# Patient Record
Sex: MALE | Race: ASIAN | NOT HISPANIC OR LATINO | Employment: OTHER | URBAN - METROPOLITAN AREA
[De-identification: names, ages, dates, MRNs, and addresses within clinical notes are randomized per-mention and may not be internally consistent; named-entity substitution may affect disease eponyms.]

---

## 2017-01-03 ENCOUNTER — HOSPITAL ENCOUNTER (OUTPATIENT)
Dept: LAB | Facility: MEDICAL CENTER | Age: 72
End: 2017-01-03
Attending: PHYSICIAN ASSISTANT
Payer: MEDICARE

## 2017-01-03 LAB
25(OH)D3 SERPL-MCNC: 24 NG/ML (ref 30–100)
BASOPHILS # BLD AUTO: 0.03 K/UL (ref 0–0.12)
BASOPHILS NFR BLD AUTO: 0.5 % (ref 0–1.8)
CK SERPL-CCNC: 124 U/L (ref 0–154)
CREAT UR-MCNC: 44.3 MG/DL
EOSINOPHIL # BLD: 0.42 K/UL (ref 0–0.51)
EOSINOPHIL NFR BLD AUTO: 7.5 % (ref 0–6.9)
ERYTHROCYTE [DISTWIDTH] IN BLOOD BY AUTOMATED COUNT: 43.2 FL (ref 35.9–50)
EST. AVERAGE GLUCOSE BLD GHB EST-MCNC: 123 MG/DL
HBA1C MFR BLD: 5.9 % (ref 0–5.6)
HCT VFR BLD AUTO: 44.1 % (ref 42–52)
HCV AB S/CO SERPL IA: NEGATIVE
HGB BLD-MCNC: 15 G/DL (ref 14–18)
IMM GRANULOCYTES # BLD AUTO: 0.01 K/UL (ref 0–0.11)
IMM GRANULOCYTES NFR BLD AUTO: 0.2 % (ref 0–0.9)
LYMPHOCYTES # BLD: 2.2 K/UL (ref 1–4.8)
LYMPHOCYTES NFR BLD AUTO: 39.5 % (ref 22–41)
MCH RBC QN AUTO: 32 PG (ref 27–33)
MCHC RBC AUTO-ENTMCNC: 34 G/DL (ref 33.7–35.3)
MCV RBC AUTO: 94 FL (ref 81.4–97.8)
MICROALBUMIN UR-MCNC: 0.8 MG/DL
MICROALBUMIN/CREAT UR: 18 MG/G (ref 0–30)
MONOCYTES # BLD: 0.43 K/UL (ref 0–0.85)
MONOCYTES NFR BLD AUTO: 7.7 % (ref 0–13.4)
NEUTROPHILS # BLD: 2.48 K/UL (ref 1.82–7.42)
NEUTROPHILS NFR BLD AUTO: 44.6 % (ref 44–72)
NRBC # BLD AUTO: 0 K/UL
NRBC BLD-RTO: 0 /100 WBC
PLATELET # BLD AUTO: 244 K/UL (ref 164–446)
PMV BLD AUTO: 9.5 FL (ref 9–12.9)
PSA SERPL DL<=0.01 NG/ML-MCNC: 4.8 NG/ML (ref 0–4)
RBC # BLD AUTO: 4.69 M/UL (ref 4.7–6.1)
TSH SERPL DL<=0.005 MIU/L-ACNC: 0.95 UIU/ML (ref 0.3–3.7)
WBC # BLD AUTO: 5.6 K/UL (ref 4.8–10.8)

## 2017-01-03 PROCEDURE — 86803 HEPATITIS C AB TEST: CPT

## 2017-01-03 PROCEDURE — 82043 UR ALBUMIN QUANTITATIVE: CPT

## 2017-01-03 PROCEDURE — 84153 ASSAY OF PSA TOTAL: CPT

## 2017-01-03 PROCEDURE — 83036 HEMOGLOBIN GLYCOSYLATED A1C: CPT

## 2017-01-03 PROCEDURE — 82306 VITAMIN D 25 HYDROXY: CPT

## 2017-01-03 PROCEDURE — 84443 ASSAY THYROID STIM HORMONE: CPT

## 2017-01-03 PROCEDURE — 82550 ASSAY OF CK (CPK): CPT

## 2017-01-03 PROCEDURE — 85025 COMPLETE CBC W/AUTO DIFF WBC: CPT

## 2017-01-03 PROCEDURE — 36415 COLL VENOUS BLD VENIPUNCTURE: CPT

## 2017-01-03 PROCEDURE — 82570 ASSAY OF URINE CREATININE: CPT

## 2017-02-17 ENCOUNTER — HOSPITAL ENCOUNTER (OUTPATIENT)
Dept: LAB | Facility: MEDICAL CENTER | Age: 72
End: 2017-02-17
Attending: FAMILY MEDICINE
Payer: MEDICARE

## 2017-02-17 LAB — PSA SERPL DL<=0.01 NG/ML-MCNC: 4.28 NG/ML (ref 0–4)

## 2017-02-17 PROCEDURE — 84153 ASSAY OF PSA TOTAL: CPT

## 2017-02-17 PROCEDURE — 36415 COLL VENOUS BLD VENIPUNCTURE: CPT

## 2017-02-22 ENCOUNTER — HOSPITAL ENCOUNTER (OUTPATIENT)
Facility: MEDICAL CENTER | Age: 72
End: 2017-02-22
Attending: UROLOGY
Payer: MEDICARE

## 2017-02-22 LAB
APPEARANCE UR: ABNORMAL
BILIRUB UR QL STRIP.AUTO: NEGATIVE
COLOR UR AUTO: ABNORMAL
EPITHELIAL CELLS 1715: ABNORMAL /HPF
GLUCOSE UR STRIP.AUTO-MCNC: NEGATIVE MG/DL
HYALINE CAST   1831: ABNORMAL /LPF
KETONES UR STRIP.AUTO-MCNC: NEGATIVE MG/DL
LEUKOCYTE ESTERASE UR QL STRIP.AUTO: ABNORMAL
MICRO URNS: ABNORMAL
MUCOUS THREADS URNS QL MICRO: ABNORMAL /HPF
NITRITE UR QL STRIP.AUTO: NEGATIVE
PH UR: 5 [PH]
PROT UR QL STRIP: NEGATIVE MG/DL
PSA SERPL DL<=0.01 NG/ML-MCNC: 4.04 NG/ML (ref 0–4)
RBC #/AREA URNS HPF: ABNORMAL /HPF
RBC UR QL AUTO: NEGATIVE
SP GR UR STRIP.AUTO: 1.01
URIC ACID CRYSTAL  1766: ABNORMAL /HPF
WBC #/AREA URNS HPF: ABNORMAL /HPF

## 2017-02-22 PROCEDURE — 81001 URINALYSIS AUTO W/SCOPE: CPT

## 2017-02-22 PROCEDURE — 84153 ASSAY OF PSA TOTAL: CPT

## 2017-02-22 PROCEDURE — 87086 URINE CULTURE/COLONY COUNT: CPT

## 2017-02-24 LAB
BACTERIA UR CULT: NORMAL
SIGNIFICANT IND 70042: NORMAL
SOURCE SOURCE: NORMAL

## 2017-07-19 ENCOUNTER — HOSPITAL ENCOUNTER (OUTPATIENT)
Dept: LAB | Facility: MEDICAL CENTER | Age: 72
End: 2017-07-19
Attending: FAMILY MEDICINE
Payer: MEDICARE

## 2017-07-19 LAB
ALBUMIN SERPL BCP-MCNC: 4 G/DL (ref 3.2–4.9)
ALBUMIN/GLOB SERPL: 1.5 G/DL
ALP SERPL-CCNC: 49 U/L (ref 30–99)
ALT SERPL-CCNC: 15 U/L (ref 2–50)
ANION GAP SERPL CALC-SCNC: 4 MMOL/L (ref 0–11.9)
APPEARANCE UR: CLEAR
AST SERPL-CCNC: 20 U/L (ref 12–45)
BASOPHILS # BLD AUTO: 0.2 % (ref 0–1.8)
BASOPHILS # BLD: 0.01 K/UL (ref 0–0.12)
BILIRUB SERPL-MCNC: 1.1 MG/DL (ref 0.1–1.5)
BILIRUB UR QL STRIP.AUTO: NEGATIVE
BUN SERPL-MCNC: 17 MG/DL (ref 8–22)
CALCIUM SERPL-MCNC: 9.2 MG/DL (ref 8.5–10.5)
CHLORIDE SERPL-SCNC: 104 MMOL/L (ref 96–112)
CHOLEST SERPL-MCNC: 132 MG/DL (ref 100–199)
CO2 SERPL-SCNC: 28 MMOL/L (ref 20–33)
COLOR UR: YELLOW
CREAT SERPL-MCNC: 1.08 MG/DL (ref 0.5–1.4)
EOSINOPHIL # BLD AUTO: 0.49 K/UL (ref 0–0.51)
EOSINOPHIL NFR BLD: 9.8 % (ref 0–6.9)
ERYTHROCYTE [DISTWIDTH] IN BLOOD BY AUTOMATED COUNT: 42.1 FL (ref 35.9–50)
EST. AVERAGE GLUCOSE BLD GHB EST-MCNC: 117 MG/DL
GFR SERPL CREATININE-BSD FRML MDRD: >60 ML/MIN/1.73 M 2
GLOBULIN SER CALC-MCNC: 2.7 G/DL (ref 1.9–3.5)
GLUCOSE SERPL-MCNC: 88 MG/DL (ref 65–99)
GLUCOSE UR STRIP.AUTO-MCNC: NEGATIVE MG/DL
HBA1C MFR BLD: 5.7 % (ref 0–5.6)
HCT VFR BLD AUTO: 42.3 % (ref 42–52)
HDLC SERPL-MCNC: 53 MG/DL
HGB BLD-MCNC: 14.3 G/DL (ref 14–18)
IMM GRANULOCYTES # BLD AUTO: 0.01 K/UL (ref 0–0.11)
IMM GRANULOCYTES NFR BLD AUTO: 0.2 % (ref 0–0.9)
KETONES UR STRIP.AUTO-MCNC: NEGATIVE MG/DL
LDLC SERPL CALC-MCNC: 66 MG/DL
LEUKOCYTE ESTERASE UR QL STRIP.AUTO: NEGATIVE
LYMPHOCYTES # BLD AUTO: 1.97 K/UL (ref 1–4.8)
LYMPHOCYTES NFR BLD: 39.6 % (ref 22–41)
MCH RBC QN AUTO: 32.2 PG (ref 27–33)
MCHC RBC AUTO-ENTMCNC: 33.8 G/DL (ref 33.7–35.3)
MCV RBC AUTO: 95.3 FL (ref 81.4–97.8)
MICRO URNS: NORMAL
MONOCYTES # BLD AUTO: 0.39 K/UL (ref 0–0.85)
MONOCYTES NFR BLD AUTO: 7.8 % (ref 0–13.4)
NEUTROPHILS # BLD AUTO: 2.11 K/UL (ref 1.82–7.42)
NEUTROPHILS NFR BLD: 42.4 % (ref 44–72)
NITRITE UR QL STRIP.AUTO: NEGATIVE
NRBC # BLD AUTO: 0 K/UL
NRBC BLD AUTO-RTO: 0 /100 WBC
PH UR STRIP.AUTO: 6 [PH]
PLATELET # BLD AUTO: 207 K/UL (ref 164–446)
PMV BLD AUTO: 10 FL (ref 9–12.9)
POTASSIUM SERPL-SCNC: 3.9 MMOL/L (ref 3.6–5.5)
PROT SERPL-MCNC: 6.7 G/DL (ref 6–8.2)
PROT UR QL STRIP: NEGATIVE MG/DL
PSA SERPL-MCNC: 1.08 NG/ML (ref 0–4)
RBC # BLD AUTO: 4.44 M/UL (ref 4.7–6.1)
RBC UR QL AUTO: NEGATIVE
SODIUM SERPL-SCNC: 136 MMOL/L (ref 135–145)
SP GR UR STRIP.AUTO: 1.01
TRIGL SERPL-MCNC: 66 MG/DL (ref 0–149)
TSH SERPL DL<=0.005 MIU/L-ACNC: 0.55 UIU/ML (ref 0.3–3.7)
UROBILINOGEN UR STRIP.AUTO-MCNC: 0.2 MG/DL
WBC # BLD AUTO: 5 K/UL (ref 4.8–10.8)

## 2017-07-19 PROCEDURE — 84153 ASSAY OF PSA TOTAL: CPT

## 2017-07-19 PROCEDURE — 85025 COMPLETE CBC W/AUTO DIFF WBC: CPT

## 2017-07-19 PROCEDURE — 83036 HEMOGLOBIN GLYCOSYLATED A1C: CPT

## 2017-07-19 PROCEDURE — 81003 URINALYSIS AUTO W/O SCOPE: CPT

## 2017-07-19 PROCEDURE — 83013 H PYLORI (C-13) BREATH: CPT

## 2017-07-19 PROCEDURE — 80061 LIPID PANEL: CPT

## 2017-07-19 PROCEDURE — 80053 COMPREHEN METABOLIC PANEL: CPT

## 2017-07-19 PROCEDURE — 84443 ASSAY THYROID STIM HORMONE: CPT

## 2017-07-19 PROCEDURE — 36415 COLL VENOUS BLD VENIPUNCTURE: CPT

## 2017-07-20 LAB — UREA BREATH TEST QL: NEGATIVE

## 2017-08-11 ENCOUNTER — HOSPITAL ENCOUNTER (OUTPATIENT)
Dept: RADIOLOGY | Facility: MEDICAL CENTER | Age: 72
End: 2017-08-11
Attending: SURGERY
Payer: MEDICARE

## 2017-08-11 DIAGNOSIS — I74.5 EMBOLISM AND THROMBOSIS OF ILIAC ARTERY (HCC): ICD-10-CM

## 2017-08-11 PROCEDURE — 93922 UPR/L XTREMITY ART 2 LEVELS: CPT | Mod: XU

## 2017-08-11 PROCEDURE — 93922 UPR/L XTREMITY ART 2 LEVELS: CPT

## 2017-08-11 PROCEDURE — 93978 VASCULAR STUDY: CPT

## 2017-11-22 ENCOUNTER — HOSPITAL ENCOUNTER (OUTPATIENT)
Dept: LAB | Facility: MEDICAL CENTER | Age: 72
End: 2017-11-22
Attending: FAMILY MEDICINE
Payer: MEDICARE

## 2017-11-22 LAB
CHOLEST SERPL-MCNC: 117 MG/DL (ref 100–199)
HDLC SERPL-MCNC: 58 MG/DL
LDLC SERPL CALC-MCNC: 44 MG/DL
TRIGL SERPL-MCNC: 75 MG/DL (ref 0–149)

## 2017-11-22 PROCEDURE — 80061 LIPID PANEL: CPT

## 2017-11-22 PROCEDURE — 36415 COLL VENOUS BLD VENIPUNCTURE: CPT

## 2017-12-28 ENCOUNTER — HOSPITAL ENCOUNTER (OUTPATIENT)
Dept: CARDIOLOGY | Facility: MEDICAL CENTER | Age: 72
End: 2017-12-28
Attending: INTERNAL MEDICINE
Payer: MEDICARE

## 2017-12-28 ENCOUNTER — HOSPITAL ENCOUNTER (OUTPATIENT)
Dept: RADIOLOGY | Facility: MEDICAL CENTER | Age: 72
End: 2017-12-28
Attending: INTERNAL MEDICINE
Payer: MEDICARE

## 2017-12-28 DIAGNOSIS — R07.89 ANTERIOR CHEST WALL PAIN: ICD-10-CM

## 2017-12-28 DIAGNOSIS — I35.1 NONRHEUMATIC AORTIC VALVE INSUFFICIENCY: ICD-10-CM

## 2017-12-28 PROCEDURE — 93306 TTE W/DOPPLER COMPLETE: CPT

## 2017-12-28 PROCEDURE — A9502 TC99M TETROFOSMIN: HCPCS

## 2017-12-29 LAB
LV EJECT FRACT  99904: 65
LV EJECT FRACT MOD 2C 99903: 63.78
LV EJECT FRACT MOD 4C 99902: 64.6
LV EJECT FRACT MOD BP 99901: 64.86

## 2018-01-23 ENCOUNTER — HOSPITAL ENCOUNTER (OUTPATIENT)
Dept: LAB | Facility: MEDICAL CENTER | Age: 73
End: 2018-01-23
Attending: FAMILY MEDICINE
Payer: MEDICARE

## 2018-01-23 LAB
ALBUMIN SERPL BCP-MCNC: 4.9 G/DL (ref 3.2–4.9)
ALBUMIN/GLOB SERPL: 2 G/DL
ALP SERPL-CCNC: 50 U/L (ref 30–99)
ALT SERPL-CCNC: 24 U/L (ref 2–50)
ANION GAP SERPL CALC-SCNC: 6 MMOL/L (ref 0–11.9)
APPEARANCE UR: CLEAR
AST SERPL-CCNC: 24 U/L (ref 12–45)
BASOPHILS # BLD AUTO: 0.5 % (ref 0–1.8)
BASOPHILS # BLD: 0.03 K/UL (ref 0–0.12)
BILIRUB SERPL-MCNC: 1 MG/DL (ref 0.1–1.5)
BILIRUB UR QL STRIP.AUTO: NEGATIVE
BUN SERPL-MCNC: 14 MG/DL (ref 8–22)
CALCIUM SERPL-MCNC: 9.6 MG/DL (ref 8.5–10.5)
CHLORIDE SERPL-SCNC: 102 MMOL/L (ref 96–112)
CHOLEST SERPL-MCNC: 129 MG/DL (ref 100–199)
CO2 SERPL-SCNC: 30 MMOL/L (ref 20–33)
COLOR UR: YELLOW
CREAT SERPL-MCNC: 1.04 MG/DL (ref 0.5–1.4)
EOSINOPHIL # BLD AUTO: 0.32 K/UL (ref 0–0.51)
EOSINOPHIL NFR BLD: 5.5 % (ref 0–6.9)
ERYTHROCYTE [DISTWIDTH] IN BLOOD BY AUTOMATED COUNT: 44.4 FL (ref 35.9–50)
EST. AVERAGE GLUCOSE BLD GHB EST-MCNC: 131 MG/DL
GLOBULIN SER CALC-MCNC: 2.4 G/DL (ref 1.9–3.5)
GLUCOSE SERPL-MCNC: 106 MG/DL (ref 65–99)
GLUCOSE UR STRIP.AUTO-MCNC: NEGATIVE MG/DL
HBA1C MFR BLD: 6.2 % (ref 0–5.6)
HCT VFR BLD AUTO: 45.5 % (ref 42–52)
HDLC SERPL-MCNC: 65 MG/DL
HGB BLD-MCNC: 14.8 G/DL (ref 14–18)
IMM GRANULOCYTES # BLD AUTO: 0.01 K/UL (ref 0–0.11)
IMM GRANULOCYTES NFR BLD AUTO: 0.2 % (ref 0–0.9)
KETONES UR STRIP.AUTO-MCNC: NEGATIVE MG/DL
LDLC SERPL CALC-MCNC: 45 MG/DL
LEUKOCYTE ESTERASE UR QL STRIP.AUTO: NEGATIVE
LYMPHOCYTES # BLD AUTO: 2.12 K/UL (ref 1–4.8)
LYMPHOCYTES NFR BLD: 36.6 % (ref 22–41)
MCH RBC QN AUTO: 31.1 PG (ref 27–33)
MCHC RBC AUTO-ENTMCNC: 32.5 G/DL (ref 33.7–35.3)
MCV RBC AUTO: 95.6 FL (ref 81.4–97.8)
MICRO URNS: NORMAL
MONOCYTES # BLD AUTO: 0.39 K/UL (ref 0–0.85)
MONOCYTES NFR BLD AUTO: 6.7 % (ref 0–13.4)
NEUTROPHILS # BLD AUTO: 2.92 K/UL (ref 1.82–7.42)
NEUTROPHILS NFR BLD: 50.5 % (ref 44–72)
NITRITE UR QL STRIP.AUTO: NEGATIVE
NRBC # BLD AUTO: 0 K/UL
NRBC BLD-RTO: 0 /100 WBC
PH UR STRIP.AUTO: 5.5 [PH]
PLATELET # BLD AUTO: 210 K/UL (ref 164–446)
PMV BLD AUTO: 10.3 FL (ref 9–12.9)
POTASSIUM SERPL-SCNC: 4.1 MMOL/L (ref 3.6–5.5)
PROT SERPL-MCNC: 7.3 G/DL (ref 6–8.2)
PROT UR QL STRIP: NEGATIVE MG/DL
PSA SERPL-MCNC: 1.37 NG/ML (ref 0–4)
RBC # BLD AUTO: 4.76 M/UL (ref 4.7–6.1)
RBC UR QL AUTO: NEGATIVE
SODIUM SERPL-SCNC: 138 MMOL/L (ref 135–145)
SP GR UR STRIP.AUTO: 1.02
TRIGL SERPL-MCNC: 93 MG/DL (ref 0–149)
TSH SERPL DL<=0.005 MIU/L-ACNC: 0.78 UIU/ML (ref 0.38–5.33)
UROBILINOGEN UR STRIP.AUTO-MCNC: 0.2 MG/DL
WBC # BLD AUTO: 5.8 K/UL (ref 4.8–10.8)

## 2018-01-23 PROCEDURE — 85025 COMPLETE CBC W/AUTO DIFF WBC: CPT

## 2018-01-23 PROCEDURE — 84153 ASSAY OF PSA TOTAL: CPT

## 2018-01-23 PROCEDURE — 83036 HEMOGLOBIN GLYCOSYLATED A1C: CPT

## 2018-01-23 PROCEDURE — 81003 URINALYSIS AUTO W/O SCOPE: CPT

## 2018-01-23 PROCEDURE — 80061 LIPID PANEL: CPT

## 2018-01-23 PROCEDURE — 80053 COMPREHEN METABOLIC PANEL: CPT

## 2018-01-23 PROCEDURE — 36415 COLL VENOUS BLD VENIPUNCTURE: CPT

## 2018-01-23 PROCEDURE — 84443 ASSAY THYROID STIM HORMONE: CPT

## 2018-01-31 ENCOUNTER — HOSPITAL ENCOUNTER (OUTPATIENT)
Dept: LAB | Facility: MEDICAL CENTER | Age: 73
End: 2018-01-31
Attending: FAMILY MEDICINE
Payer: MEDICARE

## 2018-01-31 PROCEDURE — 83013 H PYLORI (C-13) BREATH: CPT

## 2018-02-01 LAB — UREA BREATH TEST QL: NEGATIVE

## 2018-06-19 ENCOUNTER — HOSPITAL ENCOUNTER (OUTPATIENT)
Dept: RADIOLOGY | Facility: MEDICAL CENTER | Age: 73
End: 2018-06-19
Attending: FAMILY MEDICINE
Payer: MEDICARE

## 2018-06-19 DIAGNOSIS — N62 GYNECOMASTIA: ICD-10-CM

## 2018-06-19 PROCEDURE — G0279 TOMOSYNTHESIS, MAMMO: HCPCS

## 2018-06-29 ENCOUNTER — HOSPITAL ENCOUNTER (OUTPATIENT)
Dept: LAB | Facility: MEDICAL CENTER | Age: 73
End: 2018-06-29
Attending: INTERNAL MEDICINE
Payer: MEDICARE

## 2018-06-29 LAB
ALBUMIN SERPL BCP-MCNC: 4 G/DL (ref 3.2–4.9)
ALBUMIN/GLOB SERPL: 1.4 G/DL
ALP SERPL-CCNC: 54 U/L (ref 30–99)
ALT SERPL-CCNC: 16 U/L (ref 2–50)
ANION GAP SERPL CALC-SCNC: 7 MMOL/L (ref 0–11.9)
AST SERPL-CCNC: 23 U/L (ref 12–45)
BILIRUB SERPL-MCNC: 0.8 MG/DL (ref 0.1–1.5)
BUN SERPL-MCNC: 18 MG/DL (ref 8–22)
CALCIUM SERPL-MCNC: 8.9 MG/DL (ref 8.5–10.5)
CHLORIDE SERPL-SCNC: 105 MMOL/L (ref 96–112)
CHOLEST SERPL-MCNC: 120 MG/DL (ref 100–199)
CO2 SERPL-SCNC: 27 MMOL/L (ref 20–33)
CREAT SERPL-MCNC: 1.2 MG/DL (ref 0.5–1.4)
GLOBULIN SER CALC-MCNC: 2.9 G/DL (ref 1.9–3.5)
GLUCOSE SERPL-MCNC: 100 MG/DL (ref 65–99)
HDLC SERPL-MCNC: 55 MG/DL
LDLC SERPL CALC-MCNC: 47 MG/DL
POTASSIUM SERPL-SCNC: 4.2 MMOL/L (ref 3.6–5.5)
PROT SERPL-MCNC: 6.9 G/DL (ref 6–8.2)
SODIUM SERPL-SCNC: 139 MMOL/L (ref 135–145)
TRIGL SERPL-MCNC: 91 MG/DL (ref 0–149)

## 2018-06-29 PROCEDURE — 36415 COLL VENOUS BLD VENIPUNCTURE: CPT

## 2018-06-29 PROCEDURE — 80061 LIPID PANEL: CPT

## 2018-06-29 PROCEDURE — 80053 COMPREHEN METABOLIC PANEL: CPT

## 2018-10-10 ENCOUNTER — HOSPITAL ENCOUNTER (OUTPATIENT)
Dept: RADIOLOGY | Facility: MEDICAL CENTER | Age: 73
End: 2018-10-10
Attending: SURGERY
Payer: MEDICARE

## 2018-10-10 DIAGNOSIS — I74.5: ICD-10-CM

## 2018-10-10 PROCEDURE — 93922 UPR/L XTREMITY ART 2 LEVELS: CPT

## 2018-10-10 PROCEDURE — 93922 UPR/L XTREMITY ART 2 LEVELS: CPT | Mod: 26 | Performed by: SURGERY

## 2018-10-10 PROCEDURE — 93978 VASCULAR STUDY: CPT

## 2018-10-16 ENCOUNTER — HOSPITAL ENCOUNTER (OUTPATIENT)
Dept: RADIOLOGY | Facility: MEDICAL CENTER | Age: 73
End: 2018-10-16
Attending: SURGERY
Payer: MEDICARE

## 2018-10-16 DIAGNOSIS — I74.5 EMBOLISM AND THROMBOSIS OF ILIAC ARTERY (HCC): ICD-10-CM

## 2018-10-16 PROCEDURE — 72170 X-RAY EXAM OF PELVIS: CPT

## 2019-01-23 ENCOUNTER — HOSPITAL ENCOUNTER (OUTPATIENT)
Dept: LAB | Facility: MEDICAL CENTER | Age: 74
End: 2019-01-23
Attending: FAMILY MEDICINE
Payer: MEDICARE

## 2019-01-23 LAB
ALBUMIN SERPL BCP-MCNC: 4 G/DL (ref 3.2–4.9)
ALBUMIN/GLOB SERPL: 1.7 G/DL
ALP SERPL-CCNC: 52 U/L (ref 30–99)
ALT SERPL-CCNC: 24 U/L (ref 2–50)
ANION GAP SERPL CALC-SCNC: 5 MMOL/L (ref 0–11.9)
APPEARANCE UR: CLEAR
AST SERPL-CCNC: 20 U/L (ref 12–45)
BASOPHILS # BLD AUTO: 0.4 % (ref 0–1.8)
BASOPHILS # BLD: 0.02 K/UL (ref 0–0.12)
BILIRUB SERPL-MCNC: 0.9 MG/DL (ref 0.1–1.5)
BILIRUB UR QL STRIP.AUTO: NEGATIVE
BUN SERPL-MCNC: 17 MG/DL (ref 8–22)
CALCIUM SERPL-MCNC: 9.7 MG/DL (ref 8.5–10.5)
CHLORIDE SERPL-SCNC: 102 MMOL/L (ref 96–112)
CHOLEST SERPL-MCNC: 211 MG/DL (ref 100–199)
CO2 SERPL-SCNC: 30 MMOL/L (ref 20–33)
COLOR UR: YELLOW
CREAT SERPL-MCNC: 1.05 MG/DL (ref 0.5–1.4)
CREAT UR-MCNC: 36.9 MG/DL
EOSINOPHIL # BLD AUTO: 0.29 K/UL (ref 0–0.51)
EOSINOPHIL NFR BLD: 5.2 % (ref 0–6.9)
ERYTHROCYTE [DISTWIDTH] IN BLOOD BY AUTOMATED COUNT: 45.9 FL (ref 35.9–50)
EST. AVERAGE GLUCOSE BLD GHB EST-MCNC: 123 MG/DL
FASTING STATUS PATIENT QL REPORTED: NORMAL
GLOBULIN SER CALC-MCNC: 2.4 G/DL (ref 1.9–3.5)
GLUCOSE SERPL-MCNC: 97 MG/DL (ref 65–99)
GLUCOSE UR STRIP.AUTO-MCNC: NEGATIVE MG/DL
HBA1C MFR BLD: 5.9 % (ref 0–5.6)
HCT VFR BLD AUTO: 43.8 % (ref 42–52)
HDLC SERPL-MCNC: 49 MG/DL
HGB BLD-MCNC: 14.2 G/DL (ref 14–18)
IMM GRANULOCYTES # BLD AUTO: 0.02 K/UL (ref 0–0.11)
IMM GRANULOCYTES NFR BLD AUTO: 0.4 % (ref 0–0.9)
KETONES UR STRIP.AUTO-MCNC: NEGATIVE MG/DL
LDLC SERPL CALC-MCNC: 111 MG/DL
LEUKOCYTE ESTERASE UR QL STRIP.AUTO: NEGATIVE
LYMPHOCYTES # BLD AUTO: 2.11 K/UL (ref 1–4.8)
LYMPHOCYTES NFR BLD: 37.5 % (ref 22–41)
MCH RBC QN AUTO: 31.6 PG (ref 27–33)
MCHC RBC AUTO-ENTMCNC: 32.4 G/DL (ref 33.7–35.3)
MCV RBC AUTO: 97.6 FL (ref 81.4–97.8)
MICRO URNS: NORMAL
MICROALBUMIN UR-MCNC: 0.8 MG/DL
MICROALBUMIN/CREAT UR: 22 MG/G (ref 0–30)
MONOCYTES # BLD AUTO: 0.45 K/UL (ref 0–0.85)
MONOCYTES NFR BLD AUTO: 8 % (ref 0–13.4)
NEUTROPHILS # BLD AUTO: 2.74 K/UL (ref 1.82–7.42)
NEUTROPHILS NFR BLD: 48.5 % (ref 44–72)
NITRITE UR QL STRIP.AUTO: NEGATIVE
NRBC # BLD AUTO: 0 K/UL
NRBC BLD-RTO: 0 /100 WBC
PH UR STRIP.AUTO: 7 [PH]
PLATELET # BLD AUTO: 257 K/UL (ref 164–446)
PMV BLD AUTO: 9.7 FL (ref 9–12.9)
POTASSIUM SERPL-SCNC: 4 MMOL/L (ref 3.6–5.5)
PROT SERPL-MCNC: 6.4 G/DL (ref 6–8.2)
PROT UR QL STRIP: NEGATIVE MG/DL
PSA SERPL-MCNC: 2.73 NG/ML (ref 0–4)
RBC # BLD AUTO: 4.49 M/UL (ref 4.7–6.1)
RBC UR QL AUTO: NEGATIVE
SODIUM SERPL-SCNC: 137 MMOL/L (ref 135–145)
SP GR UR STRIP.AUTO: 1.01
TRIGL SERPL-MCNC: 257 MG/DL (ref 0–149)
TSH SERPL DL<=0.005 MIU/L-ACNC: 0.46 UIU/ML (ref 0.38–5.33)
UROBILINOGEN UR STRIP.AUTO-MCNC: 0.2 MG/DL
WBC # BLD AUTO: 5.6 K/UL (ref 4.8–10.8)

## 2019-01-23 PROCEDURE — 84443 ASSAY THYROID STIM HORMONE: CPT

## 2019-01-23 PROCEDURE — 81003 URINALYSIS AUTO W/O SCOPE: CPT

## 2019-01-23 PROCEDURE — 80053 COMPREHEN METABOLIC PANEL: CPT

## 2019-01-23 PROCEDURE — 84153 ASSAY OF PSA TOTAL: CPT

## 2019-01-23 PROCEDURE — 36415 COLL VENOUS BLD VENIPUNCTURE: CPT

## 2019-01-23 PROCEDURE — 82570 ASSAY OF URINE CREATININE: CPT

## 2019-01-23 PROCEDURE — 80061 LIPID PANEL: CPT

## 2019-01-23 PROCEDURE — 85025 COMPLETE CBC W/AUTO DIFF WBC: CPT

## 2019-01-23 PROCEDURE — 83036 HEMOGLOBIN GLYCOSYLATED A1C: CPT

## 2019-01-23 PROCEDURE — 82043 UR ALBUMIN QUANTITATIVE: CPT

## 2019-04-22 ENCOUNTER — HOSPITAL ENCOUNTER (OUTPATIENT)
Dept: LAB | Facility: MEDICAL CENTER | Age: 74
End: 2019-04-22
Attending: FAMILY MEDICINE
Payer: MEDICARE

## 2019-04-22 PROCEDURE — 80061 LIPID PANEL: CPT

## 2019-04-22 PROCEDURE — 36415 COLL VENOUS BLD VENIPUNCTURE: CPT

## 2019-04-23 LAB
CHOLEST SERPL-MCNC: 131 MG/DL (ref 100–199)
FASTING STATUS PATIENT QL REPORTED: NORMAL
HDLC SERPL-MCNC: 61 MG/DL
LDLC SERPL CALC-MCNC: 58 MG/DL
TRIGL SERPL-MCNC: 60 MG/DL (ref 0–149)

## 2019-08-27 ENCOUNTER — HOSPITAL ENCOUNTER (OUTPATIENT)
Dept: CARDIOLOGY | Facility: MEDICAL CENTER | Age: 74
End: 2019-08-27
Attending: INTERNAL MEDICINE
Payer: MEDICARE

## 2019-08-27 DIAGNOSIS — I35.1 NONRHEUMATIC AORTIC VALVE INSUFFICIENCY: ICD-10-CM

## 2019-08-27 LAB
LV EJECT FRACT  99904: 60
LV EJECT FRACT MOD 2C 99903: 69.23
LV EJECT FRACT MOD 4C 99902: 69.48
LV EJECT FRACT MOD BP 99901: 68.99

## 2019-08-27 PROCEDURE — 93306 TTE W/DOPPLER COMPLETE: CPT | Mod: 26 | Performed by: INTERNAL MEDICINE

## 2019-08-27 PROCEDURE — 93306 TTE W/DOPPLER COMPLETE: CPT

## 2019-12-02 ENCOUNTER — HOSPITAL ENCOUNTER (OUTPATIENT)
Dept: CARDIOLOGY | Facility: MEDICAL CENTER | Age: 74
End: 2019-12-02
Attending: INTERNAL MEDICINE
Payer: MEDICARE

## 2019-12-02 ENCOUNTER — HOSPITAL ENCOUNTER (OUTPATIENT)
Dept: LAB | Facility: MEDICAL CENTER | Age: 74
End: 2019-12-02
Attending: INTERNAL MEDICINE
Payer: MEDICARE

## 2019-12-02 DIAGNOSIS — I35.1 NONRHEUMATIC AORTIC (VALVE) INSUFFICIENCY: ICD-10-CM

## 2019-12-02 LAB
ALBUMIN SERPL BCP-MCNC: 4.4 G/DL (ref 3.2–4.9)
ALBUMIN/GLOB SERPL: 1.9 G/DL
ALP SERPL-CCNC: 53 U/L (ref 30–99)
ALT SERPL-CCNC: 18 U/L (ref 2–50)
ANION GAP SERPL CALC-SCNC: 8 MMOL/L (ref 0–11.9)
AST SERPL-CCNC: 21 U/L (ref 12–45)
BASOPHILS # BLD AUTO: 0.3 % (ref 0–1.8)
BASOPHILS # BLD: 0.02 K/UL (ref 0–0.12)
BILIRUB SERPL-MCNC: 0.8 MG/DL (ref 0.1–1.5)
BUN SERPL-MCNC: 16 MG/DL (ref 8–22)
CALCIUM SERPL-MCNC: 9 MG/DL (ref 8.5–10.5)
CHLORIDE SERPL-SCNC: 104 MMOL/L (ref 96–112)
CHOLEST SERPL-MCNC: 148 MG/DL (ref 100–199)
CO2 SERPL-SCNC: 27 MMOL/L (ref 20–33)
CREAT SERPL-MCNC: 1.12 MG/DL (ref 0.5–1.4)
EOSINOPHIL # BLD AUTO: 0.52 K/UL (ref 0–0.51)
EOSINOPHIL NFR BLD: 8.4 % (ref 0–6.9)
ERYTHROCYTE [DISTWIDTH] IN BLOOD BY AUTOMATED COUNT: 44.9 FL (ref 35.9–50)
FASTING STATUS PATIENT QL REPORTED: NORMAL
GLOBULIN SER CALC-MCNC: 2.3 G/DL (ref 1.9–3.5)
GLUCOSE SERPL-MCNC: 97 MG/DL (ref 65–99)
HCT VFR BLD AUTO: 43.8 % (ref 42–52)
HDLC SERPL-MCNC: 56 MG/DL
HGB BLD-MCNC: 14.7 G/DL (ref 14–18)
IMM GRANULOCYTES # BLD AUTO: 0.01 K/UL (ref 0–0.11)
IMM GRANULOCYTES NFR BLD AUTO: 0.2 % (ref 0–0.9)
LDLC SERPL CALC-MCNC: 70 MG/DL
LV EJECT FRACT  99904: 60
LV EJECT FRACT MOD 2C 99903: 61.86
LV EJECT FRACT MOD 4C 99902: 59.18
LV EJECT FRACT MOD BP 99901: 61.32
LYMPHOCYTES # BLD AUTO: 2.17 K/UL (ref 1–4.8)
LYMPHOCYTES NFR BLD: 35.1 % (ref 22–41)
MCH RBC QN AUTO: 32.4 PG (ref 27–33)
MCHC RBC AUTO-ENTMCNC: 33.6 G/DL (ref 33.7–35.3)
MCV RBC AUTO: 96.5 FL (ref 81.4–97.8)
MONOCYTES # BLD AUTO: 0.45 K/UL (ref 0–0.85)
MONOCYTES NFR BLD AUTO: 7.3 % (ref 0–13.4)
NEUTROPHILS # BLD AUTO: 3.01 K/UL (ref 1.82–7.42)
NEUTROPHILS NFR BLD: 48.7 % (ref 44–72)
NRBC # BLD AUTO: 0 K/UL
NRBC BLD-RTO: 0 /100 WBC
PLATELET # BLD AUTO: 224 K/UL (ref 164–446)
PMV BLD AUTO: 10.2 FL (ref 9–12.9)
POTASSIUM SERPL-SCNC: 3.8 MMOL/L (ref 3.6–5.5)
PROT SERPL-MCNC: 6.7 G/DL (ref 6–8.2)
RBC # BLD AUTO: 4.54 M/UL (ref 4.7–6.1)
SODIUM SERPL-SCNC: 139 MMOL/L (ref 135–145)
TRIGL SERPL-MCNC: 110 MG/DL (ref 0–149)
WBC # BLD AUTO: 6.2 K/UL (ref 4.8–10.8)

## 2019-12-02 PROCEDURE — 93306 TTE W/DOPPLER COMPLETE: CPT | Mod: 26 | Performed by: INTERNAL MEDICINE

## 2019-12-02 PROCEDURE — 80053 COMPREHEN METABOLIC PANEL: CPT

## 2019-12-02 PROCEDURE — 93306 TTE W/DOPPLER COMPLETE: CPT

## 2019-12-02 PROCEDURE — 80061 LIPID PANEL: CPT

## 2019-12-02 PROCEDURE — 36415 COLL VENOUS BLD VENIPUNCTURE: CPT

## 2019-12-02 PROCEDURE — 85025 COMPLETE CBC W/AUTO DIFF WBC: CPT

## 2020-06-05 ENCOUNTER — HOSPITAL ENCOUNTER (OUTPATIENT)
Dept: LAB | Facility: MEDICAL CENTER | Age: 75
End: 2020-06-05
Attending: FAMILY MEDICINE
Payer: MEDICARE

## 2020-06-05 ENCOUNTER — HOSPITAL ENCOUNTER (OUTPATIENT)
Dept: LAB | Facility: MEDICAL CENTER | Age: 75
End: 2020-06-05
Attending: INTERNAL MEDICINE
Payer: MEDICARE

## 2020-06-05 LAB
ALBUMIN SERPL BCP-MCNC: 4.5 G/DL (ref 3.2–4.9)
ALBUMIN SERPL BCP-MCNC: 4.6 G/DL (ref 3.2–4.9)
ALBUMIN/GLOB SERPL: 1.7 G/DL
ALBUMIN/GLOB SERPL: 1.7 G/DL
ALP SERPL-CCNC: 71 U/L (ref 30–99)
ALP SERPL-CCNC: 72 U/L (ref 30–99)
ALT SERPL-CCNC: 20 U/L (ref 2–50)
ALT SERPL-CCNC: 20 U/L (ref 2–50)
ANION GAP SERPL CALC-SCNC: 11 MMOL/L (ref 7–16)
ANION GAP SERPL CALC-SCNC: 11 MMOL/L (ref 7–16)
APPEARANCE UR: CLEAR
AST SERPL-CCNC: 27 U/L (ref 12–45)
AST SERPL-CCNC: 28 U/L (ref 12–45)
BASOPHILS # BLD AUTO: 0.4 % (ref 0–1.8)
BASOPHILS # BLD: 0.02 K/UL (ref 0–0.12)
BILIRUB SERPL-MCNC: 0.7 MG/DL (ref 0.1–1.5)
BILIRUB SERPL-MCNC: 0.7 MG/DL (ref 0.1–1.5)
BILIRUB UR QL STRIP.AUTO: NEGATIVE
BUN SERPL-MCNC: 15 MG/DL (ref 8–22)
BUN SERPL-MCNC: 15 MG/DL (ref 8–22)
CALCIUM SERPL-MCNC: 9.6 MG/DL (ref 8.5–10.5)
CALCIUM SERPL-MCNC: 9.6 MG/DL (ref 8.5–10.5)
CHLORIDE SERPL-SCNC: 99 MMOL/L (ref 96–112)
CHLORIDE SERPL-SCNC: 99 MMOL/L (ref 96–112)
CHOLEST SERPL-MCNC: 132 MG/DL (ref 100–199)
CHOLEST SERPL-MCNC: 134 MG/DL (ref 100–199)
CO2 SERPL-SCNC: 27 MMOL/L (ref 20–33)
CO2 SERPL-SCNC: 28 MMOL/L (ref 20–33)
COLOR UR: YELLOW
CREAT SERPL-MCNC: 1.21 MG/DL (ref 0.5–1.4)
CREAT SERPL-MCNC: 1.21 MG/DL (ref 0.5–1.4)
EOSINOPHIL # BLD AUTO: 0.5 K/UL (ref 0–0.51)
EOSINOPHIL NFR BLD: 9.5 % (ref 0–6.9)
ERYTHROCYTE [DISTWIDTH] IN BLOOD BY AUTOMATED COUNT: 45.4 FL (ref 35.9–50)
EST. AVERAGE GLUCOSE BLD GHB EST-MCNC: 128 MG/DL
FASTING STATUS PATIENT QL REPORTED: NORMAL
FASTING STATUS PATIENT QL REPORTED: NORMAL
GLOBULIN SER CALC-MCNC: 2.7 G/DL (ref 1.9–3.5)
GLOBULIN SER CALC-MCNC: 2.7 G/DL (ref 1.9–3.5)
GLUCOSE SERPL-MCNC: 105 MG/DL (ref 65–99)
GLUCOSE SERPL-MCNC: 105 MG/DL (ref 65–99)
GLUCOSE UR STRIP.AUTO-MCNC: NEGATIVE MG/DL
HBA1C MFR BLD: 6.1 % (ref 0–5.6)
HCT VFR BLD AUTO: 44.7 % (ref 42–52)
HCV AB SER QL: NORMAL
HDLC SERPL-MCNC: 63 MG/DL
HDLC SERPL-MCNC: 63 MG/DL
HGB BLD-MCNC: 14.6 G/DL (ref 14–18)
IMM GRANULOCYTES # BLD AUTO: 0.01 K/UL (ref 0–0.11)
IMM GRANULOCYTES NFR BLD AUTO: 0.2 % (ref 0–0.9)
KETONES UR STRIP.AUTO-MCNC: NEGATIVE MG/DL
LDLC SERPL CALC-MCNC: 52 MG/DL
LDLC SERPL CALC-MCNC: 54 MG/DL
LEUKOCYTE ESTERASE UR QL STRIP.AUTO: NEGATIVE
LYMPHOCYTES # BLD AUTO: 1.85 K/UL (ref 1–4.8)
LYMPHOCYTES NFR BLD: 35.1 % (ref 22–41)
MCH RBC QN AUTO: 32 PG (ref 27–33)
MCHC RBC AUTO-ENTMCNC: 32.7 G/DL (ref 33.7–35.3)
MCV RBC AUTO: 98 FL (ref 81.4–97.8)
MICRO URNS: NORMAL
MONOCYTES # BLD AUTO: 0.41 K/UL (ref 0–0.85)
MONOCYTES NFR BLD AUTO: 7.8 % (ref 0–13.4)
NEUTROPHILS # BLD AUTO: 2.48 K/UL (ref 1.82–7.42)
NEUTROPHILS NFR BLD: 47 % (ref 44–72)
NITRITE UR QL STRIP.AUTO: NEGATIVE
NRBC # BLD AUTO: 0 K/UL
NRBC BLD-RTO: 0 /100 WBC
PH UR STRIP.AUTO: 6 [PH] (ref 5–8)
PLATELET # BLD AUTO: 240 K/UL (ref 164–446)
PMV BLD AUTO: 9.6 FL (ref 9–12.9)
POTASSIUM SERPL-SCNC: 4.1 MMOL/L (ref 3.6–5.5)
POTASSIUM SERPL-SCNC: 4.1 MMOL/L (ref 3.6–5.5)
PROT SERPL-MCNC: 7.2 G/DL (ref 6–8.2)
PROT SERPL-MCNC: 7.3 G/DL (ref 6–8.2)
PROT UR QL STRIP: NEGATIVE MG/DL
PSA SERPL-MCNC: 4.65 NG/ML (ref 0–4)
RBC # BLD AUTO: 4.56 M/UL (ref 4.7–6.1)
RBC UR QL AUTO: NEGATIVE
SODIUM SERPL-SCNC: 137 MMOL/L (ref 135–145)
SODIUM SERPL-SCNC: 138 MMOL/L (ref 135–145)
SP GR UR STRIP.AUTO: 1.01
TRIGL SERPL-MCNC: 86 MG/DL (ref 0–149)
TRIGL SERPL-MCNC: 87 MG/DL (ref 0–149)
TSH SERPL DL<=0.005 MIU/L-ACNC: 0.66 UIU/ML (ref 0.38–5.33)
UROBILINOGEN UR STRIP.AUTO-MCNC: 0.2 MG/DL
WBC # BLD AUTO: 5.3 K/UL (ref 4.8–10.8)

## 2020-06-05 PROCEDURE — 80053 COMPREHEN METABOLIC PANEL: CPT | Mod: 91

## 2020-06-05 PROCEDURE — 83036 HEMOGLOBIN GLYCOSYLATED A1C: CPT

## 2020-06-05 PROCEDURE — 80053 COMPREHEN METABOLIC PANEL: CPT

## 2020-06-05 PROCEDURE — 85025 COMPLETE CBC W/AUTO DIFF WBC: CPT

## 2020-06-05 PROCEDURE — 84153 ASSAY OF PSA TOTAL: CPT

## 2020-06-05 PROCEDURE — 36415 COLL VENOUS BLD VENIPUNCTURE: CPT

## 2020-06-05 PROCEDURE — 84403 ASSAY OF TOTAL TESTOSTERONE: CPT

## 2020-06-05 PROCEDURE — 86803 HEPATITIS C AB TEST: CPT

## 2020-06-05 PROCEDURE — 81003 URINALYSIS AUTO W/O SCOPE: CPT

## 2020-06-05 PROCEDURE — 84270 ASSAY OF SEX HORMONE GLOBUL: CPT

## 2020-06-05 PROCEDURE — 84443 ASSAY THYROID STIM HORMONE: CPT

## 2020-06-05 PROCEDURE — 80061 LIPID PANEL: CPT | Mod: 91

## 2020-06-05 PROCEDURE — 80061 LIPID PANEL: CPT

## 2020-06-05 PROCEDURE — 84402 ASSAY OF FREE TESTOSTERONE: CPT

## 2020-06-07 LAB
SHBG SERPL-SCNC: 36 NMOL/L (ref 11–80)
TESTOST FREE MFR SERPL: 1.7 % (ref 1.6–2.9)
TESTOST FREE SERPL-MCNC: 56 PG/ML (ref 47–244)
TESTOST SERPL-MCNC: 326 NG/DL (ref 300–720)

## 2020-07-14 ENCOUNTER — HOSPITAL ENCOUNTER (OUTPATIENT)
Dept: RADIOLOGY | Facility: MEDICAL CENTER | Age: 75
End: 2020-07-14
Attending: INTERNAL MEDICINE
Payer: MEDICARE

## 2020-07-14 DIAGNOSIS — R07.89 ANTERIOR CHEST WALL PAIN: ICD-10-CM

## 2020-07-14 PROCEDURE — 700111 HCHG RX REV CODE 636 W/ 250 OVERRIDE (IP)

## 2020-07-14 PROCEDURE — 78452 HT MUSCLE IMAGE SPECT MULT: CPT | Mod: 26 | Performed by: INTERNAL MEDICINE

## 2020-07-14 PROCEDURE — A9502 TC99M TETROFOSMIN: HCPCS

## 2020-07-14 PROCEDURE — 93018 CV STRESS TEST I&R ONLY: CPT | Performed by: INTERNAL MEDICINE

## 2020-07-14 RX ORDER — REGADENOSON 0.08 MG/ML
INJECTION, SOLUTION INTRAVENOUS
Status: COMPLETED
Start: 2020-07-14 | End: 2020-07-14

## 2020-07-14 RX ADMIN — REGADENOSON 0.4 MG: 0.08 INJECTION, SOLUTION INTRAVENOUS at 15:03

## 2020-07-20 ENCOUNTER — HOSPITAL ENCOUNTER (OUTPATIENT)
Dept: LAB | Facility: MEDICAL CENTER | Age: 75
End: 2020-07-20
Attending: FAMILY MEDICINE
Payer: MEDICARE

## 2020-07-20 LAB
APPEARANCE UR: ABNORMAL
BACTERIA #/AREA URNS HPF: NEGATIVE /HPF
BILIRUB UR QL STRIP.AUTO: NEGATIVE
COLOR UR: YELLOW
EPI CELLS #/AREA URNS HPF: NEGATIVE /HPF
GLUCOSE UR STRIP.AUTO-MCNC: NEGATIVE MG/DL
HYALINE CASTS #/AREA URNS LPF: ABNORMAL /LPF
KETONES UR STRIP.AUTO-MCNC: NEGATIVE MG/DL
LEUKOCYTE ESTERASE UR QL STRIP.AUTO: NEGATIVE
MICRO URNS: ABNORMAL
NITRITE UR QL STRIP.AUTO: NEGATIVE
PH UR STRIP.AUTO: 5.5 [PH] (ref 5–8)
PROT UR QL STRIP: NEGATIVE MG/DL
PSA SERPL-MCNC: 5.22 NG/ML (ref 0–4)
RBC # URNS HPF: ABNORMAL /HPF
RBC UR QL AUTO: NEGATIVE
SP GR UR STRIP.AUTO: 1.01
UROBILINOGEN UR STRIP.AUTO-MCNC: 0.2 MG/DL
WBC #/AREA URNS HPF: ABNORMAL /HPF

## 2020-07-20 PROCEDURE — 36415 COLL VENOUS BLD VENIPUNCTURE: CPT

## 2020-07-20 PROCEDURE — 84153 ASSAY OF PSA TOTAL: CPT

## 2020-07-20 PROCEDURE — 81001 URINALYSIS AUTO W/SCOPE: CPT

## 2020-10-19 ENCOUNTER — IMMUNIZATION (OUTPATIENT)
Dept: SOCIAL WORK | Facility: CLINIC | Age: 75
End: 2020-10-19
Payer: MEDICARE

## 2020-10-19 DIAGNOSIS — Z23 NEED FOR VACCINATION: ICD-10-CM

## 2020-10-19 PROCEDURE — 90662 IIV NO PRSV INCREASED AG IM: CPT | Performed by: REGISTERED NURSE

## 2020-10-19 PROCEDURE — G0008 ADMIN INFLUENZA VIRUS VAC: HCPCS | Performed by: REGISTERED NURSE

## 2020-10-23 ENCOUNTER — HOSPITAL ENCOUNTER (OUTPATIENT)
Dept: LAB | Facility: MEDICAL CENTER | Age: 75
End: 2020-10-23
Attending: FAMILY MEDICINE
Payer: MEDICARE

## 2020-10-23 LAB — PSA SERPL-MCNC: 4.13 NG/ML (ref 0–4)

## 2020-10-23 PROCEDURE — 84153 ASSAY OF PSA TOTAL: CPT

## 2020-10-23 PROCEDURE — 81539 ONCOLOGY PROSTATE PROB SCORE: CPT

## 2020-10-23 PROCEDURE — 36415 COLL VENOUS BLD VENIPUNCTURE: CPT

## 2020-10-30 LAB
4K - PSA, FREE Q5895: 0.32 NG/ML
4K - PSA, TOTAL Q5894: 4.26 NG/ML
4K - SCORE Q5892: 47 %
4K -PSA, PERCENT FREE Q5896: 8 %

## 2021-01-11 DIAGNOSIS — Z23 NEED FOR VACCINATION: ICD-10-CM

## 2021-01-23 ENCOUNTER — IMMUNIZATION (OUTPATIENT)
Dept: FAMILY PLANNING/WOMEN'S HEALTH CLINIC | Facility: IMMUNIZATION CENTER | Age: 76
End: 2021-01-23
Attending: INTERNAL MEDICINE
Payer: MEDICARE

## 2021-01-23 DIAGNOSIS — Z23 ENCOUNTER FOR VACCINATION: Primary | ICD-10-CM

## 2021-01-23 DIAGNOSIS — Z23 NEED FOR VACCINATION: ICD-10-CM

## 2021-01-23 PROCEDURE — 91300 PFIZER SARS-COV-2 VACCINE: CPT

## 2021-01-23 PROCEDURE — 0001A PFIZER SARS-COV-2 VACCINE: CPT

## 2021-02-13 ENCOUNTER — APPOINTMENT (OUTPATIENT)
Dept: FAMILY PLANNING/WOMEN'S HEALTH CLINIC | Facility: IMMUNIZATION CENTER | Age: 76
End: 2021-02-13
Attending: INTERNAL MEDICINE
Payer: MEDICARE

## 2021-02-13 ENCOUNTER — IMMUNIZATION (OUTPATIENT)
Dept: FAMILY PLANNING/WOMEN'S HEALTH CLINIC | Facility: IMMUNIZATION CENTER | Age: 76
End: 2021-02-13
Payer: MEDICARE

## 2021-02-13 DIAGNOSIS — Z23 ENCOUNTER FOR VACCINATION: Primary | ICD-10-CM

## 2021-02-13 PROCEDURE — 91300 PFIZER SARS-COV-2 VACCINE: CPT

## 2021-02-13 PROCEDURE — 0002A PFIZER SARS-COV-2 VACCINE: CPT

## 2021-04-16 ENCOUNTER — HOSPITAL ENCOUNTER (OUTPATIENT)
Dept: CARDIOLOGY | Facility: MEDICAL CENTER | Age: 76
End: 2021-04-16
Attending: INTERNAL MEDICINE
Payer: MEDICARE

## 2021-04-16 ENCOUNTER — HOSPITAL ENCOUNTER (OUTPATIENT)
Dept: RADIOLOGY | Facility: MEDICAL CENTER | Age: 76
End: 2021-04-16
Attending: INTERNAL MEDICINE
Payer: MEDICARE

## 2021-04-16 DIAGNOSIS — I35.1 NONRHEUMATIC AORTIC (VALVE) INSUFFICIENCY: ICD-10-CM

## 2021-04-16 DIAGNOSIS — E78.2 MIXED HYPERLIPIDEMIA: ICD-10-CM

## 2021-04-16 DIAGNOSIS — R42 VERTIGO: ICD-10-CM

## 2021-04-16 LAB
LV EJECT FRACT MOD 2C 99903: 65.81
LV EJECT FRACT MOD 4C 99902: 58.4
LV EJECT FRACT MOD BP 99901: 61.76

## 2021-04-16 PROCEDURE — 93306 TTE W/DOPPLER COMPLETE: CPT

## 2021-04-16 PROCEDURE — 93880 EXTRACRANIAL BILAT STUDY: CPT

## 2021-05-18 ENCOUNTER — PATIENT MESSAGE (OUTPATIENT)
Dept: HEALTH INFORMATION MANAGEMENT | Facility: OTHER | Age: 76
End: 2021-05-18

## 2021-06-10 ENCOUNTER — HOSPITAL ENCOUNTER (OUTPATIENT)
Dept: LAB | Facility: MEDICAL CENTER | Age: 76
End: 2021-06-10
Attending: FAMILY MEDICINE
Payer: MEDICARE

## 2021-06-10 LAB
ALBUMIN SERPL BCP-MCNC: 4.2 G/DL (ref 3.2–4.9)
ALBUMIN/GLOB SERPL: 1.4 G/DL
ALP SERPL-CCNC: 67 U/L (ref 30–99)
ALT SERPL-CCNC: 25 U/L (ref 2–50)
ANION GAP SERPL CALC-SCNC: 8 MMOL/L (ref 7–16)
AST SERPL-CCNC: 24 U/L (ref 12–45)
BASOPHILS # BLD AUTO: 0.2 % (ref 0–1.8)
BASOPHILS # BLD: 0.01 K/UL (ref 0–0.12)
BILIRUB SERPL-MCNC: 0.7 MG/DL (ref 0.1–1.5)
BUN SERPL-MCNC: 15 MG/DL (ref 8–22)
CALCIUM SERPL-MCNC: 9.2 MG/DL (ref 8.5–10.5)
CHLORIDE SERPL-SCNC: 100 MMOL/L (ref 96–112)
CHOLEST SERPL-MCNC: 217 MG/DL (ref 100–199)
CO2 SERPL-SCNC: 27 MMOL/L (ref 20–33)
CREAT SERPL-MCNC: 1.22 MG/DL (ref 0.5–1.4)
EOSINOPHIL # BLD AUTO: 0.46 K/UL (ref 0–0.51)
EOSINOPHIL NFR BLD: 10.2 % (ref 0–6.9)
ERYTHROCYTE [DISTWIDTH] IN BLOOD BY AUTOMATED COUNT: 46.6 FL (ref 35.9–50)
EST. AVERAGE GLUCOSE BLD GHB EST-MCNC: 126 MG/DL
GLOBULIN SER CALC-MCNC: 3 G/DL (ref 1.9–3.5)
GLUCOSE SERPL-MCNC: 105 MG/DL (ref 65–99)
HBA1C MFR BLD: 6 % (ref 4–5.6)
HCT VFR BLD AUTO: 42.2 % (ref 42–52)
HDLC SERPL-MCNC: 49 MG/DL
HGB BLD-MCNC: 14 G/DL (ref 14–18)
IMM GRANULOCYTES # BLD AUTO: 0.01 K/UL (ref 0–0.11)
IMM GRANULOCYTES NFR BLD AUTO: 0.2 % (ref 0–0.9)
LDLC SERPL CALC-MCNC: 144 MG/DL
LYMPHOCYTES # BLD AUTO: 1.44 K/UL (ref 1–4.8)
LYMPHOCYTES NFR BLD: 31.9 % (ref 22–41)
MCH RBC QN AUTO: 31.7 PG (ref 27–33)
MCHC RBC AUTO-ENTMCNC: 33.2 G/DL (ref 33.7–35.3)
MCV RBC AUTO: 95.5 FL (ref 81.4–97.8)
MONOCYTES # BLD AUTO: 0.48 K/UL (ref 0–0.85)
MONOCYTES NFR BLD AUTO: 10.6 % (ref 0–13.4)
NEUTROPHILS # BLD AUTO: 2.11 K/UL (ref 1.82–7.42)
NEUTROPHILS NFR BLD: 46.9 % (ref 44–72)
NRBC # BLD AUTO: 0 K/UL
NRBC BLD-RTO: 0 /100 WBC
PLATELET # BLD AUTO: 228 K/UL (ref 164–446)
PMV BLD AUTO: 9.6 FL (ref 9–12.9)
POTASSIUM SERPL-SCNC: 4.5 MMOL/L (ref 3.6–5.5)
PROT SERPL-MCNC: 7.2 G/DL (ref 6–8.2)
PSA SERPL-MCNC: 2.74 NG/ML (ref 0–4)
RBC # BLD AUTO: 4.42 M/UL (ref 4.7–6.1)
SODIUM SERPL-SCNC: 135 MMOL/L (ref 135–145)
TRIGL SERPL-MCNC: 120 MG/DL (ref 0–149)
WBC # BLD AUTO: 4.5 K/UL (ref 4.8–10.8)

## 2021-06-10 PROCEDURE — 80053 COMPREHEN METABOLIC PANEL: CPT

## 2021-06-10 PROCEDURE — 85025 COMPLETE CBC W/AUTO DIFF WBC: CPT

## 2021-06-10 PROCEDURE — 84270 ASSAY OF SEX HORMONE GLOBUL: CPT

## 2021-06-10 PROCEDURE — 84403 ASSAY OF TOTAL TESTOSTERONE: CPT

## 2021-06-10 PROCEDURE — 84153 ASSAY OF PSA TOTAL: CPT

## 2021-06-10 PROCEDURE — 84402 ASSAY OF FREE TESTOSTERONE: CPT

## 2021-06-10 PROCEDURE — 83036 HEMOGLOBIN GLYCOSYLATED A1C: CPT

## 2021-06-10 PROCEDURE — 80061 LIPID PANEL: CPT

## 2021-06-10 PROCEDURE — 36415 COLL VENOUS BLD VENIPUNCTURE: CPT

## 2021-06-12 LAB
SHBG SERPL-SCNC: 33 NMOL/L (ref 11–80)
TESTOST FREE MFR SERPL: 1.8 % (ref 1.6–2.9)
TESTOST FREE SERPL-MCNC: 52 PG/ML (ref 47–244)
TESTOST SERPL-MCNC: 292 NG/DL (ref 300–720)

## 2021-08-16 ENCOUNTER — HOSPITAL ENCOUNTER (OUTPATIENT)
Dept: LAB | Facility: MEDICAL CENTER | Age: 76
End: 2021-08-16
Attending: FAMILY MEDICINE
Payer: MEDICARE

## 2021-08-16 LAB
ALBUMIN SERPL BCP-MCNC: 4.1 G/DL (ref 3.2–4.9)
ALBUMIN/GLOB SERPL: 1.5 G/DL
ALP SERPL-CCNC: 70 U/L (ref 30–99)
ALT SERPL-CCNC: 24 U/L (ref 2–50)
ANION GAP SERPL CALC-SCNC: 10 MMOL/L (ref 7–16)
APPEARANCE UR: CLEAR
AST SERPL-CCNC: 23 U/L (ref 12–45)
BASOPHILS # BLD AUTO: 0.5 % (ref 0–1.8)
BASOPHILS # BLD: 0.03 K/UL (ref 0–0.12)
BILIRUB SERPL-MCNC: 0.8 MG/DL (ref 0.1–1.5)
BILIRUB UR QL STRIP.AUTO: NEGATIVE
BUN SERPL-MCNC: 13 MG/DL (ref 8–22)
CALCIUM SERPL-MCNC: 9.4 MG/DL (ref 8.5–10.5)
CHLORIDE SERPL-SCNC: 101 MMOL/L (ref 96–112)
CHOLEST SERPL-MCNC: 205 MG/DL (ref 100–199)
CO2 SERPL-SCNC: 26 MMOL/L (ref 20–33)
COLOR UR: YELLOW
CREAT SERPL-MCNC: 1.06 MG/DL (ref 0.5–1.4)
EOSINOPHIL # BLD AUTO: 0.47 K/UL (ref 0–0.51)
EOSINOPHIL NFR BLD: 8.6 % (ref 0–6.9)
ERYTHROCYTE [DISTWIDTH] IN BLOOD BY AUTOMATED COUNT: 45.3 FL (ref 35.9–50)
EST. AVERAGE GLUCOSE BLD GHB EST-MCNC: 117 MG/DL
FASTING STATUS PATIENT QL REPORTED: NORMAL
GLOBULIN SER CALC-MCNC: 2.7 G/DL (ref 1.9–3.5)
GLUCOSE SERPL-MCNC: 104 MG/DL (ref 65–99)
GLUCOSE UR STRIP.AUTO-MCNC: NEGATIVE MG/DL
HBA1C MFR BLD: 5.7 % (ref 4–5.6)
HCT VFR BLD AUTO: 41.9 % (ref 42–52)
HDLC SERPL-MCNC: 57 MG/DL
HGB BLD-MCNC: 14.3 G/DL (ref 14–18)
IMM GRANULOCYTES # BLD AUTO: 0.02 K/UL (ref 0–0.11)
IMM GRANULOCYTES NFR BLD AUTO: 0.4 % (ref 0–0.9)
KETONES UR STRIP.AUTO-MCNC: NEGATIVE MG/DL
LDLC SERPL CALC-MCNC: 115 MG/DL
LEUKOCYTE ESTERASE UR QL STRIP.AUTO: NEGATIVE
LYMPHOCYTES # BLD AUTO: 2.03 K/UL (ref 1–4.8)
LYMPHOCYTES NFR BLD: 37 % (ref 22–41)
MCH RBC QN AUTO: 32.6 PG (ref 27–33)
MCHC RBC AUTO-ENTMCNC: 34.1 G/DL (ref 33.7–35.3)
MCV RBC AUTO: 95.7 FL (ref 81.4–97.8)
MICRO URNS: NORMAL
MONOCYTES # BLD AUTO: 0.45 K/UL (ref 0–0.85)
MONOCYTES NFR BLD AUTO: 8.2 % (ref 0–13.4)
NEUTROPHILS # BLD AUTO: 2.48 K/UL (ref 1.82–7.42)
NEUTROPHILS NFR BLD: 45.3 % (ref 44–72)
NITRITE UR QL STRIP.AUTO: NEGATIVE
NRBC # BLD AUTO: 0 K/UL
NRBC BLD-RTO: 0 /100 WBC
PH UR STRIP.AUTO: 6 [PH] (ref 5–8)
PLATELET # BLD AUTO: 217 K/UL (ref 164–446)
PMV BLD AUTO: 9.6 FL (ref 9–12.9)
POTASSIUM SERPL-SCNC: 4.4 MMOL/L (ref 3.6–5.5)
PROT SERPL-MCNC: 6.8 G/DL (ref 6–8.2)
PROT UR QL STRIP: NEGATIVE MG/DL
RBC # BLD AUTO: 4.38 M/UL (ref 4.7–6.1)
RBC UR QL AUTO: NEGATIVE
SODIUM SERPL-SCNC: 137 MMOL/L (ref 135–145)
SP GR UR STRIP.AUTO: 1.01
TRIGL SERPL-MCNC: 164 MG/DL (ref 0–149)
TSH SERPL DL<=0.005 MIU/L-ACNC: 1.24 UIU/ML (ref 0.38–5.33)
UROBILINOGEN UR STRIP.AUTO-MCNC: 0.2 MG/DL
WBC # BLD AUTO: 5.5 K/UL (ref 4.8–10.8)

## 2021-08-16 PROCEDURE — 36415 COLL VENOUS BLD VENIPUNCTURE: CPT

## 2021-08-16 PROCEDURE — 84443 ASSAY THYROID STIM HORMONE: CPT

## 2021-08-16 PROCEDURE — 81003 URINALYSIS AUTO W/O SCOPE: CPT

## 2021-08-16 PROCEDURE — 80061 LIPID PANEL: CPT

## 2021-08-16 PROCEDURE — 80053 COMPREHEN METABOLIC PANEL: CPT

## 2021-08-16 PROCEDURE — 83036 HEMOGLOBIN GLYCOSYLATED A1C: CPT

## 2021-08-16 PROCEDURE — 85025 COMPLETE CBC W/AUTO DIFF WBC: CPT

## 2021-11-03 ENCOUNTER — TELEPHONE (OUTPATIENT)
Dept: HEALTH INFORMATION MANAGEMENT | Facility: OTHER | Age: 76
End: 2021-11-03

## 2021-11-03 NOTE — TELEPHONE ENCOUNTER
Outcome: Left Message    Please transfer to Patient Outreach Team at [phone] when patient returns call.      [facility] Verified: yes    Attempt # 2

## 2021-11-09 ENCOUNTER — PATIENT MESSAGE (OUTPATIENT)
Dept: HEALTH INFORMATION MANAGEMENT | Facility: OTHER | Age: 76
End: 2021-11-09

## 2021-11-10 PROBLEM — R73.03 PREDIABETES: Status: ACTIVE | Noted: 2021-11-10

## 2021-11-15 ENCOUNTER — HOSPITAL ENCOUNTER (OUTPATIENT)
Dept: LAB | Facility: MEDICAL CENTER | Age: 76
End: 2021-11-15
Attending: UROLOGY
Payer: MEDICARE

## 2021-11-15 ENCOUNTER — HOSPITAL ENCOUNTER (OUTPATIENT)
Dept: LAB | Facility: MEDICAL CENTER | Age: 76
End: 2021-11-15
Attending: FAMILY MEDICINE
Payer: MEDICARE

## 2021-11-15 LAB
ALBUMIN SERPL BCP-MCNC: 4.3 G/DL (ref 3.2–4.9)
ALBUMIN/GLOB SERPL: 1.4 G/DL
ALP SERPL-CCNC: 64 U/L (ref 30–99)
ALT SERPL-CCNC: 28 U/L (ref 2–50)
ANION GAP SERPL CALC-SCNC: 8 MMOL/L (ref 7–16)
AST SERPL-CCNC: 31 U/L (ref 12–45)
BILIRUB SERPL-MCNC: 0.9 MG/DL (ref 0.1–1.5)
BUN SERPL-MCNC: 15 MG/DL (ref 8–22)
CALCIUM SERPL-MCNC: 9.3 MG/DL (ref 8.5–10.5)
CHLORIDE SERPL-SCNC: 101 MMOL/L (ref 96–112)
CO2 SERPL-SCNC: 28 MMOL/L (ref 20–33)
CREAT SERPL-MCNC: 1.06 MG/DL (ref 0.5–1.4)
GLOBULIN SER CALC-MCNC: 3.1 G/DL (ref 1.9–3.5)
GLUCOSE SERPL-MCNC: 104 MG/DL (ref 65–99)
POTASSIUM SERPL-SCNC: 4.5 MMOL/L (ref 3.6–5.5)
PROT SERPL-MCNC: 7.4 G/DL (ref 6–8.2)
PSA SERPL-MCNC: 0.96 NG/ML (ref 0–4)
SODIUM SERPL-SCNC: 137 MMOL/L (ref 135–145)

## 2021-11-15 PROCEDURE — 80053 COMPREHEN METABOLIC PANEL: CPT

## 2021-11-15 PROCEDURE — 84153 ASSAY OF PSA TOTAL: CPT

## 2021-11-15 PROCEDURE — 36415 COLL VENOUS BLD VENIPUNCTURE: CPT

## 2022-04-14 ENCOUNTER — TELEPHONE (OUTPATIENT)
Dept: HEALTH INFORMATION MANAGEMENT | Facility: OTHER | Age: 77
End: 2022-04-14
Payer: MEDICARE

## 2022-04-14 NOTE — TELEPHONE ENCOUNTER
[employer] verified/ Pt called requesting assistance to unlock his [employer] account. / Done  Pt had no more request at this time.

## 2022-04-22 ENCOUNTER — HOSPITAL ENCOUNTER (OUTPATIENT)
Dept: LAB | Facility: MEDICAL CENTER | Age: 77
End: 2022-04-22
Attending: FAMILY MEDICINE
Payer: MEDICARE

## 2022-04-22 LAB
ALBUMIN SERPL BCP-MCNC: 4.6 G/DL (ref 3.2–4.9)
ALBUMIN/GLOB SERPL: 1.8 G/DL
ALP SERPL-CCNC: 72 U/L (ref 30–99)
ALT SERPL-CCNC: 55 U/L (ref 2–50)
ANION GAP SERPL CALC-SCNC: 10 MMOL/L (ref 7–16)
AST SERPL-CCNC: 39 U/L (ref 12–45)
BASOPHILS # BLD AUTO: 0.6 % (ref 0–1.8)
BASOPHILS # BLD: 0.03 K/UL (ref 0–0.12)
BILIRUB SERPL-MCNC: 0.7 MG/DL (ref 0.1–1.5)
BUN SERPL-MCNC: 17 MG/DL (ref 8–22)
CALCIUM SERPL-MCNC: 9.5 MG/DL (ref 8.5–10.5)
CHLORIDE SERPL-SCNC: 103 MMOL/L (ref 96–112)
CO2 SERPL-SCNC: 25 MMOL/L (ref 20–33)
CREAT SERPL-MCNC: 1.1 MG/DL (ref 0.5–1.4)
EOSINOPHIL # BLD AUTO: 0.32 K/UL (ref 0–0.51)
EOSINOPHIL NFR BLD: 5.9 % (ref 0–6.9)
ERYTHROCYTE [DISTWIDTH] IN BLOOD BY AUTOMATED COUNT: 44.5 FL (ref 35.9–50)
EST. AVERAGE GLUCOSE BLD GHB EST-MCNC: 131 MG/DL
GFR SERPLBLD CREATININE-BSD FMLA CKD-EPI: 69 ML/MIN/1.73 M 2
GLOBULIN SER CALC-MCNC: 2.5 G/DL (ref 1.9–3.5)
GLUCOSE SERPL-MCNC: 97 MG/DL (ref 65–99)
HBA1C MFR BLD: 6.2 % (ref 4–5.6)
HCT VFR BLD AUTO: 42.8 % (ref 42–52)
HGB BLD-MCNC: 14.5 G/DL (ref 14–18)
IMM GRANULOCYTES # BLD AUTO: 0.03 K/UL (ref 0–0.11)
IMM GRANULOCYTES NFR BLD AUTO: 0.6 % (ref 0–0.9)
LYMPHOCYTES # BLD AUTO: 1.88 K/UL (ref 1–4.8)
LYMPHOCYTES NFR BLD: 34.5 % (ref 22–41)
MCH RBC QN AUTO: 31.8 PG (ref 27–33)
MCHC RBC AUTO-ENTMCNC: 33.9 G/DL (ref 33.7–35.3)
MCV RBC AUTO: 93.9 FL (ref 81.4–97.8)
MONOCYTES # BLD AUTO: 0.46 K/UL (ref 0–0.85)
MONOCYTES NFR BLD AUTO: 8.4 % (ref 0–13.4)
NEUTROPHILS # BLD AUTO: 2.73 K/UL (ref 1.82–7.42)
NEUTROPHILS NFR BLD: 50 % (ref 44–72)
NRBC # BLD AUTO: 0 K/UL
NRBC BLD-RTO: 0 /100 WBC
PLATELET # BLD AUTO: 226 K/UL (ref 164–446)
PMV BLD AUTO: 9.8 FL (ref 9–12.9)
POTASSIUM SERPL-SCNC: 4.6 MMOL/L (ref 3.6–5.5)
PROT SERPL-MCNC: 7.1 G/DL (ref 6–8.2)
PSA SERPL-MCNC: 0.67 NG/ML (ref 0–4)
RBC # BLD AUTO: 4.56 M/UL (ref 4.7–6.1)
SODIUM SERPL-SCNC: 138 MMOL/L (ref 135–145)
TSH SERPL DL<=0.005 MIU/L-ACNC: 1.22 UIU/ML (ref 0.38–5.33)
WBC # BLD AUTO: 5.5 K/UL (ref 4.8–10.8)

## 2022-04-22 PROCEDURE — 80053 COMPREHEN METABOLIC PANEL: CPT

## 2022-04-22 PROCEDURE — 84153 ASSAY OF PSA TOTAL: CPT

## 2022-04-22 PROCEDURE — 84443 ASSAY THYROID STIM HORMONE: CPT

## 2022-04-22 PROCEDURE — 36415 COLL VENOUS BLD VENIPUNCTURE: CPT

## 2022-04-22 PROCEDURE — 85025 COMPLETE CBC W/AUTO DIFF WBC: CPT

## 2022-04-22 PROCEDURE — 83036 HEMOGLOBIN GLYCOSYLATED A1C: CPT

## 2022-06-13 ENCOUNTER — TELEPHONE (OUTPATIENT)
Dept: HEALTH INFORMATION MANAGEMENT | Facility: OTHER | Age: 77
End: 2022-06-13
Payer: MEDICARE

## 2022-06-13 NOTE — TELEPHONE ENCOUNTER
Jasiel returned outbound call from [facility]. I did schedule him on 6/16/22 at 10 am with Dr. Isbell at [address] [facility]. HIPAA verified. Jasiel also wanted to schedule his PET scan that was ordered by Dr. Ferguson. Vera transferred his call to [facility] with Imaging. No other questions or concerns.

## 2022-06-20 PROBLEM — Z85.46 HISTORY OF PROSTATE CANCER: Status: RESOLVED | Noted: 2022-06-20 | Resolved: 2022-06-20

## 2022-06-20 PROBLEM — R35.1 BENIGN PROSTATIC HYPERPLASIA WITH NOCTURIA: Status: ACTIVE | Noted: 2022-06-20

## 2022-06-20 PROBLEM — R97.20 ELEVATED PSA: Status: ACTIVE | Noted: 2022-06-20

## 2022-06-20 PROBLEM — Z87.448 HISTORY OF CHRONIC KIDNEY DISEASE: Status: ACTIVE | Noted: 2022-06-20

## 2022-06-20 PROBLEM — Z85.46 HISTORY OF PROSTATE CANCER: Status: ACTIVE | Noted: 2022-06-20

## 2022-06-20 PROBLEM — C61 PROSTATE CANCER (HCC): Status: ACTIVE | Noted: 2022-06-20

## 2022-06-20 PROBLEM — H81.10 BENIGN POSITIONAL VERTIGO: Status: ACTIVE | Noted: 2022-06-20

## 2022-06-20 PROBLEM — N40.1 BENIGN PROSTATIC HYPERPLASIA WITH NOCTURIA: Status: ACTIVE | Noted: 2022-06-20

## 2022-07-05 ENCOUNTER — APPOINTMENT (OUTPATIENT)
Dept: RADIOLOGY | Facility: MEDICAL CENTER | Age: 77
End: 2022-07-05
Attending: FAMILY MEDICINE
Payer: MEDICARE

## 2022-07-07 ENCOUNTER — HOSPITAL ENCOUNTER (OUTPATIENT)
Dept: RADIOLOGY | Facility: MEDICAL CENTER | Age: 77
End: 2022-07-07
Attending: FAMILY MEDICINE
Payer: MEDICARE

## 2022-07-07 DIAGNOSIS — C61 MALIGNANT NEOPLASM OF PROSTATE (HCC): ICD-10-CM

## 2022-07-07 PROCEDURE — A9595 CT-PETCT-PROSTATE SCAN SKULL BASE TO MID-THIGH (PYLARIFY OR AXUMIN): HCPCS

## 2022-10-15 ENCOUNTER — HOSPITAL ENCOUNTER (OUTPATIENT)
Dept: LAB | Facility: MEDICAL CENTER | Age: 77
End: 2022-10-15
Attending: FAMILY MEDICINE
Payer: MEDICARE

## 2022-10-15 LAB
ALBUMIN SERPL BCP-MCNC: 4.8 G/DL (ref 3.2–4.9)
ALBUMIN/GLOB SERPL: 1.6 G/DL
ALP SERPL-CCNC: 85 U/L (ref 30–99)
ALT SERPL-CCNC: 20 U/L (ref 2–50)
ANION GAP SERPL CALC-SCNC: 11 MMOL/L (ref 7–16)
AST SERPL-CCNC: 23 U/L (ref 12–45)
BASOPHILS # BLD AUTO: 0.4 % (ref 0–1.8)
BASOPHILS # BLD: 0.02 K/UL (ref 0–0.12)
BILIRUB SERPL-MCNC: 0.7 MG/DL (ref 0.1–1.5)
BUN SERPL-MCNC: 16 MG/DL (ref 8–22)
CALCIUM SERPL-MCNC: 9.7 MG/DL (ref 8.5–10.5)
CHLORIDE SERPL-SCNC: 101 MMOL/L (ref 96–112)
CHOLEST SERPL-MCNC: 182 MG/DL (ref 100–199)
CO2 SERPL-SCNC: 27 MMOL/L (ref 20–33)
CREAT SERPL-MCNC: 1.1 MG/DL (ref 0.5–1.4)
EOSINOPHIL # BLD AUTO: 0.31 K/UL (ref 0–0.51)
EOSINOPHIL NFR BLD: 5.9 % (ref 0–6.9)
ERYTHROCYTE [DISTWIDTH] IN BLOOD BY AUTOMATED COUNT: 46.5 FL (ref 35.9–50)
EST. AVERAGE GLUCOSE BLD GHB EST-MCNC: 128 MG/DL
FASTING STATUS PATIENT QL REPORTED: NORMAL
GFR SERPLBLD CREATININE-BSD FMLA CKD-EPI: 69 ML/MIN/1.73 M 2
GLOBULIN SER CALC-MCNC: 3 G/DL (ref 1.9–3.5)
GLUCOSE SERPL-MCNC: 102 MG/DL (ref 65–99)
HBA1C MFR BLD: 6.1 % (ref 4–5.6)
HCT VFR BLD AUTO: 43 % (ref 42–52)
HDLC SERPL-MCNC: 52 MG/DL
HGB BLD-MCNC: 14.3 G/DL (ref 14–18)
IMM GRANULOCYTES # BLD AUTO: 0.02 K/UL (ref 0–0.11)
IMM GRANULOCYTES NFR BLD AUTO: 0.4 % (ref 0–0.9)
LDLC SERPL CALC-MCNC: 103 MG/DL
LYMPHOCYTES # BLD AUTO: 2.23 K/UL (ref 1–4.8)
LYMPHOCYTES NFR BLD: 42.4 % (ref 22–41)
MCH RBC QN AUTO: 32 PG (ref 27–33)
MCHC RBC AUTO-ENTMCNC: 33.3 G/DL (ref 33.7–35.3)
MCV RBC AUTO: 96.2 FL (ref 81.4–97.8)
MONOCYTES # BLD AUTO: 0.39 K/UL (ref 0–0.85)
MONOCYTES NFR BLD AUTO: 7.4 % (ref 0–13.4)
NEUTROPHILS # BLD AUTO: 2.29 K/UL (ref 1.82–7.42)
NEUTROPHILS NFR BLD: 43.5 % (ref 44–72)
NRBC # BLD AUTO: 0 K/UL
NRBC BLD-RTO: 0 /100 WBC
PLATELET # BLD AUTO: 247 K/UL (ref 164–446)
PMV BLD AUTO: 9.6 FL (ref 9–12.9)
POTASSIUM SERPL-SCNC: 4.5 MMOL/L (ref 3.6–5.5)
PROT SERPL-MCNC: 7.8 G/DL (ref 6–8.2)
PSA SERPL-MCNC: 0.62 NG/ML (ref 0–4)
RBC # BLD AUTO: 4.47 M/UL (ref 4.7–6.1)
SODIUM SERPL-SCNC: 139 MMOL/L (ref 135–145)
TRIGL SERPL-MCNC: 137 MG/DL (ref 0–149)
TSH SERPL DL<=0.005 MIU/L-ACNC: 0.81 UIU/ML (ref 0.38–5.33)
WBC # BLD AUTO: 5.3 K/UL (ref 4.8–10.8)

## 2022-10-15 PROCEDURE — 36415 COLL VENOUS BLD VENIPUNCTURE: CPT

## 2022-10-15 PROCEDURE — 84270 ASSAY OF SEX HORMONE GLOBUL: CPT

## 2022-10-15 PROCEDURE — 85025 COMPLETE CBC W/AUTO DIFF WBC: CPT

## 2022-10-15 PROCEDURE — 84443 ASSAY THYROID STIM HORMONE: CPT

## 2022-10-15 PROCEDURE — 83036 HEMOGLOBIN GLYCOSYLATED A1C: CPT

## 2022-10-15 PROCEDURE — 84402 ASSAY OF FREE TESTOSTERONE: CPT

## 2022-10-15 PROCEDURE — 84153 ASSAY OF PSA TOTAL: CPT

## 2022-10-15 PROCEDURE — 84403 ASSAY OF TOTAL TESTOSTERONE: CPT

## 2022-10-15 PROCEDURE — 80053 COMPREHEN METABOLIC PANEL: CPT

## 2022-10-15 PROCEDURE — 80061 LIPID PANEL: CPT

## 2022-10-17 LAB
SHBG SERPL-SCNC: 41 NMOL/L (ref 19–76)
TESTOST FREE MFR SERPL: 1.6 % (ref 1.6–2.9)
TESTOST FREE SERPL-MCNC: 55 PG/ML (ref 47–244)
TESTOST SERPL-MCNC: 343 NG/DL (ref 300–720)

## 2022-11-18 ENCOUNTER — DOCUMENTATION (OUTPATIENT)
Dept: HEALTH INFORMATION MANAGEMENT | Facility: OTHER | Age: 77
End: 2022-11-18
Payer: MEDICARE

## 2023-03-14 ENCOUNTER — HOSPITAL ENCOUNTER (OUTPATIENT)
Dept: LAB | Facility: MEDICAL CENTER | Age: 78
End: 2023-03-14
Attending: NURSE PRACTITIONER
Payer: MEDICARE

## 2023-03-14 LAB
ALBUMIN SERPL BCP-MCNC: 4.2 G/DL (ref 3.2–4.9)
ALBUMIN/GLOB SERPL: 1.6 G/DL
ALP SERPL-CCNC: 67 U/L (ref 30–99)
ALT SERPL-CCNC: 18 U/L (ref 2–50)
ANION GAP SERPL CALC-SCNC: 11 MMOL/L (ref 7–16)
APPEARANCE UR: CLEAR
AST SERPL-CCNC: 17 U/L (ref 12–45)
BASOPHILS # BLD AUTO: 0.2 % (ref 0–1.8)
BASOPHILS # BLD: 0.01 K/UL (ref 0–0.12)
BILIRUB SERPL-MCNC: 0.7 MG/DL (ref 0.1–1.5)
BILIRUB UR QL STRIP.AUTO: NEGATIVE
BUN SERPL-MCNC: 21 MG/DL (ref 8–22)
CALCIUM ALBUM COR SERPL-MCNC: 9.1 MG/DL (ref 8.5–10.5)
CALCIUM SERPL-MCNC: 9.3 MG/DL (ref 8.5–10.5)
CHLORIDE SERPL-SCNC: 104 MMOL/L (ref 96–112)
CHOLEST SERPL-MCNC: 194 MG/DL (ref 100–199)
CO2 SERPL-SCNC: 25 MMOL/L (ref 20–33)
COLOR UR: YELLOW
CREAT SERPL-MCNC: 1.18 MG/DL (ref 0.5–1.4)
EOSINOPHIL # BLD AUTO: 0.21 K/UL (ref 0–0.51)
EOSINOPHIL NFR BLD: 3.8 % (ref 0–6.9)
ERYTHROCYTE [DISTWIDTH] IN BLOOD BY AUTOMATED COUNT: 47.9 FL (ref 35.9–50)
EST. AVERAGE GLUCOSE BLD GHB EST-MCNC: 137 MG/DL
FASTING STATUS PATIENT QL REPORTED: NORMAL
GFR SERPLBLD CREATININE-BSD FMLA CKD-EPI: 63 ML/MIN/1.73 M 2
GLOBULIN SER CALC-MCNC: 2.7 G/DL (ref 1.9–3.5)
GLUCOSE SERPL-MCNC: 108 MG/DL (ref 65–99)
GLUCOSE UR STRIP.AUTO-MCNC: NEGATIVE MG/DL
HBA1C MFR BLD: 6.4 % (ref 4–5.6)
HCT VFR BLD AUTO: 40.8 % (ref 42–52)
HDLC SERPL-MCNC: 63 MG/DL
HGB BLD-MCNC: 13.6 G/DL (ref 14–18)
IMM GRANULOCYTES # BLD AUTO: 0.02 K/UL (ref 0–0.11)
IMM GRANULOCYTES NFR BLD AUTO: 0.4 % (ref 0–0.9)
KETONES UR STRIP.AUTO-MCNC: NEGATIVE MG/DL
LDLC SERPL CALC-MCNC: 112 MG/DL
LEUKOCYTE ESTERASE UR QL STRIP.AUTO: NEGATIVE
LYMPHOCYTES # BLD AUTO: 1.72 K/UL (ref 1–4.8)
LYMPHOCYTES NFR BLD: 31 % (ref 22–41)
MCH RBC QN AUTO: 31.9 PG (ref 27–33)
MCHC RBC AUTO-ENTMCNC: 33.3 G/DL (ref 33.7–35.3)
MCV RBC AUTO: 95.8 FL (ref 81.4–97.8)
MICRO URNS: NORMAL
MONOCYTES # BLD AUTO: 0.48 K/UL (ref 0–0.85)
MONOCYTES NFR BLD AUTO: 8.6 % (ref 0–13.4)
NEUTROPHILS # BLD AUTO: 3.11 K/UL (ref 1.82–7.42)
NEUTROPHILS NFR BLD: 56 % (ref 44–72)
NITRITE UR QL STRIP.AUTO: NEGATIVE
NRBC # BLD AUTO: 0 K/UL
NRBC BLD-RTO: 0 /100 WBC
PH UR STRIP.AUTO: 6.5 [PH] (ref 5–8)
PLATELET # BLD AUTO: 198 K/UL (ref 164–446)
PMV BLD AUTO: 9.4 FL (ref 9–12.9)
POTASSIUM SERPL-SCNC: 3.9 MMOL/L (ref 3.6–5.5)
PROT SERPL-MCNC: 6.9 G/DL (ref 6–8.2)
PROT UR QL STRIP: NEGATIVE MG/DL
PSA SERPL-MCNC: 0.42 NG/ML (ref 0–4)
RBC # BLD AUTO: 4.26 M/UL (ref 4.7–6.1)
RBC UR QL AUTO: NEGATIVE
SODIUM SERPL-SCNC: 140 MMOL/L (ref 135–145)
SP GR UR STRIP.AUTO: 1.02
TRIGL SERPL-MCNC: 96 MG/DL (ref 0–149)
TSH SERPL DL<=0.005 MIU/L-ACNC: 1.24 UIU/ML (ref 0.38–5.33)
UROBILINOGEN UR STRIP.AUTO-MCNC: 0.2 MG/DL
WBC # BLD AUTO: 5.6 K/UL (ref 4.8–10.8)

## 2023-03-14 PROCEDURE — 80061 LIPID PANEL: CPT

## 2023-03-14 PROCEDURE — 84443 ASSAY THYROID STIM HORMONE: CPT

## 2023-03-14 PROCEDURE — 83036 HEMOGLOBIN GLYCOSYLATED A1C: CPT

## 2023-03-14 PROCEDURE — 84153 ASSAY OF PSA TOTAL: CPT

## 2023-03-14 PROCEDURE — 36415 COLL VENOUS BLD VENIPUNCTURE: CPT

## 2023-03-14 PROCEDURE — 81003 URINALYSIS AUTO W/O SCOPE: CPT

## 2023-03-14 PROCEDURE — 85025 COMPLETE CBC W/AUTO DIFF WBC: CPT

## 2023-03-14 PROCEDURE — 80053 COMPREHEN METABOLIC PANEL: CPT

## 2023-07-03 ENCOUNTER — TELEPHONE (OUTPATIENT)
Dept: CARDIOLOGY | Facility: MEDICAL CENTER | Age: 78
End: 2023-07-03
Payer: MEDICARE

## 2023-07-11 ENCOUNTER — HOSPITAL ENCOUNTER (OUTPATIENT)
Dept: LAB | Facility: MEDICAL CENTER | Age: 78
End: 2023-07-11
Attending: FAMILY MEDICINE
Payer: MEDICARE

## 2023-07-11 LAB
BASOPHILS # BLD AUTO: 0.3 % (ref 0–1.8)
BASOPHILS # BLD: 0.02 K/UL (ref 0–0.12)
EOSINOPHIL # BLD AUTO: 0.5 K/UL (ref 0–0.51)
EOSINOPHIL NFR BLD: 8.2 % (ref 0–6.9)
ERYTHROCYTE [DISTWIDTH] IN BLOOD BY AUTOMATED COUNT: 45.1 FL (ref 35.9–50)
HCT VFR BLD AUTO: 44.8 % (ref 42–52)
HGB BLD-MCNC: 14.5 G/DL (ref 14–18)
IMM GRANULOCYTES # BLD AUTO: 0.01 K/UL (ref 0–0.11)
IMM GRANULOCYTES NFR BLD AUTO: 0.2 % (ref 0–0.9)
LYMPHOCYTES # BLD AUTO: 3.14 K/UL (ref 1–4.8)
LYMPHOCYTES NFR BLD: 51.4 % (ref 22–41)
MCH RBC QN AUTO: 31.4 PG (ref 27–33)
MCHC RBC AUTO-ENTMCNC: 32.4 G/DL (ref 32.3–36.5)
MCV RBC AUTO: 97 FL (ref 81.4–97.8)
MONOCYTES # BLD AUTO: 0.44 K/UL (ref 0–0.85)
MONOCYTES NFR BLD AUTO: 7.2 % (ref 0–13.4)
NEUTROPHILS # BLD AUTO: 2 K/UL (ref 1.82–7.42)
NEUTROPHILS NFR BLD: 32.7 % (ref 44–72)
NRBC # BLD AUTO: 0 K/UL
NRBC BLD-RTO: 0 /100 WBC (ref 0–0.2)
PLATELET # BLD AUTO: 205 K/UL (ref 164–446)
PMV BLD AUTO: 9.7 FL (ref 9–12.9)
RBC # BLD AUTO: 4.62 M/UL (ref 4.7–6.1)
WBC # BLD AUTO: 6.1 K/UL (ref 4.8–10.8)

## 2023-07-11 PROCEDURE — 36415 COLL VENOUS BLD VENIPUNCTURE: CPT

## 2023-07-11 PROCEDURE — 85025 COMPLETE CBC W/AUTO DIFF WBC: CPT

## 2023-08-16 ENCOUNTER — OFFICE VISIT (OUTPATIENT)
Dept: CARDIOLOGY | Facility: MEDICAL CENTER | Age: 78
End: 2023-08-16
Attending: INTERNAL MEDICINE
Payer: MEDICARE

## 2023-08-16 VITALS
HEIGHT: 64 IN | SYSTOLIC BLOOD PRESSURE: 122 MMHG | BODY MASS INDEX: 27.83 KG/M2 | HEART RATE: 69 BPM | WEIGHT: 163 LBS | DIASTOLIC BLOOD PRESSURE: 62 MMHG | RESPIRATION RATE: 16 BRPM | OXYGEN SATURATION: 95 %

## 2023-08-16 DIAGNOSIS — I77.810 ASCENDING AORTA DILATATION (HCC): ICD-10-CM

## 2023-08-16 DIAGNOSIS — R42 DIZZINESS: ICD-10-CM

## 2023-08-16 DIAGNOSIS — I73.9 PAD (PERIPHERAL ARTERY DISEASE) (HCC): ICD-10-CM

## 2023-08-16 DIAGNOSIS — I35.1 NONRHEUMATIC AORTIC VALVE INSUFFICIENCY: ICD-10-CM

## 2023-08-16 DIAGNOSIS — I20.89 ANGINA OF EFFORT (HCC): ICD-10-CM

## 2023-08-16 DIAGNOSIS — I74.09 AORTOILIAC OCCLUSIVE DISEASE (HCC): Chronic | ICD-10-CM

## 2023-08-16 DIAGNOSIS — I10 PRIMARY HYPERTENSION: ICD-10-CM

## 2023-08-16 DIAGNOSIS — E78.5 DYSLIPIDEMIA: ICD-10-CM

## 2023-08-16 LAB — EKG IMPRESSION: NORMAL

## 2023-08-16 PROCEDURE — 99214 OFFICE O/P EST MOD 30 MIN: CPT | Performed by: INTERNAL MEDICINE

## 2023-08-16 PROCEDURE — 3078F DIAST BP <80 MM HG: CPT | Performed by: INTERNAL MEDICINE

## 2023-08-16 PROCEDURE — 93010 ELECTROCARDIOGRAM REPORT: CPT | Performed by: INTERNAL MEDICINE

## 2023-08-16 PROCEDURE — 93005 ELECTROCARDIOGRAM TRACING: CPT | Performed by: INTERNAL MEDICINE

## 2023-08-16 PROCEDURE — 99212 OFFICE O/P EST SF 10 MIN: CPT | Performed by: INTERNAL MEDICINE

## 2023-08-16 PROCEDURE — 3074F SYST BP LT 130 MM HG: CPT | Performed by: INTERNAL MEDICINE

## 2023-08-16 PROCEDURE — 99202 OFFICE O/P NEW SF 15 MIN: CPT | Performed by: INTERNAL MEDICINE

## 2023-08-16 RX ORDER — VITAMIN B COMPLEX
1000 TABLET ORAL DAILY
COMMUNITY

## 2023-08-16 RX ORDER — AMLODIPINE BESYLATE 2.5 MG/1
1 TABLET ORAL 2 TIMES DAILY
COMMUNITY
Start: 2023-01-26 | End: 2023-11-14

## 2023-08-16 RX ORDER — MAGNESIUM GLYCINATE 100 MG
CAPSULE ORAL
COMMUNITY
Start: 2023-08-10

## 2023-08-16 RX ORDER — ROSUVASTATIN CALCIUM 20 MG/1
20 TABLET, COATED ORAL DAILY
Qty: 100 TABLET | Refills: 3 | Status: SHIPPED | OUTPATIENT
Start: 2023-08-16 | End: 2024-02-22

## 2023-08-16 RX ORDER — ROSUVASTATIN CALCIUM 10 MG/1
TABLET, COATED ORAL
COMMUNITY
Start: 2023-02-02 | End: 2023-08-16 | Stop reason: SDUPTHER

## 2023-08-16 RX ORDER — PANTOPRAZOLE SODIUM 40 MG/1
20 TABLET, DELAYED RELEASE ORAL DAILY
COMMUNITY

## 2023-08-16 RX ORDER — ASPIRIN 81 MG/1
81 TABLET ORAL DAILY
Qty: 100 TABLET | Refills: 0 | Status: SHIPPED | OUTPATIENT
Start: 2023-08-16 | End: 2023-10-13

## 2023-08-16 ASSESSMENT — ENCOUNTER SYMPTOMS
PHOTOPHOBIA: 1
LOSS OF BALANCE: 0
SYNCOPE: 0
SHORTNESS OF BREATH: 0
PALPITATIONS: 0
FALLS: 0
HEADACHES: 0
BACK PAIN: 1
PARESTHESIAS: 1
WHEEZING: 0
VOMITING: 0
NEAR-SYNCOPE: 0
LIGHT-HEADEDNESS: 0
IRREGULAR HEARTBEAT: 0
ORTHOPNEA: 0
DYSPNEA ON EXERTION: 0
DIZZINESS: 1
PND: 0
DIARRHEA: 0
BLURRED VISION: 1
CONSTIPATION: 0
SORE THROAT: 0
WEAKNESS: 0
FLANK PAIN: 0
EXCESSIVE DAYTIME SLEEPINESS: 0
MYALGIAS: 0
SLEEP DISTURBANCES DUE TO BREATHING: 0
BLOATING: 0
NUMBNESS: 0
EYE REDNESS: 1
NIGHT SWEATS: 0
NAUSEA: 0
FEVER: 0
COUGH: 0
DECREASED APPETITE: 0
DOUBLE VISION: 0
DIAPHORESIS: 0

## 2023-08-16 ASSESSMENT — FIBROSIS 4 INDEX: FIB4 SCORE: 1.51

## 2023-08-16 NOTE — PROGRESS NOTES
Cardiology Initial Consultation Note    Date of note:    8/16/2023    Primary Care Provider: Jose Ferguson M.D.  Referring Provider: Jose Ferguson M.D.     Patient Name: Jasiel Silver   YOB: 1945  MRN:              [id number]    Chief Complaint   Patient presents with    Hypertension    Establish Care    Other     F/V Dx: Aortoiliac occlusive disease       History of Present Illness: Mr. Jasiel Silver is a 77 y.o. male whose current medical problems include prediabetes, hypertension and dyslipidemia who is here for cardiac consultation for chest pain.    Patient states that he has been dealing with left sided chest pain for the past 4 years.  Feels deep inside his chest.  Can happen at rest and sometimes with exertion.  Does feel increasing chest tightness when he exercises or plays tennis.  Sometimes he can continue but at other times he has to stop due to ongoing symptoms.    Has peripheral arterial disease with prior left iliac stent placement.  Does not take aspirin.    In terms of physical activity, is active and plays tennis on a regular basis.    Cardiovascular Risk Factors:  1. Smoking status: Former smoker, quit 1973  2. Type II Diabetes Mellitus: Prediabetes  Lab Results   Component Value Date/Time    HBA1C 6.4 (H) 03/14/2023 06:46 AM    HBA1C 6.1 (H) 10/15/2022 07:10 AM     3. Hypertension: Yes  4. Dyslipidemia: Yes  Cholesterol,Tot   Date Value Ref Range Status   03/14/2023 194 100 - 199 mg/dL Final     LDL   Date Value Ref Range Status   03/14/2023 112 (H) <100 mg/dL Final     HDL   Date Value Ref Range Status   03/14/2023 63 >=40 mg/dL Final     Triglycerides   Date Value Ref Range Status   03/14/2023 96 0 - 149 mg/dL Final     5. Family history of early Coronary Artery Disease: Mother had heart attack      Review of Systems   Constitutional: Negative for decreased appetite, diaphoresis, fever, malaise/fatigue and night sweats.   HENT:  Positive for hearing loss and  tinnitus. Negative for congestion and sore throat.    Eyes:  Positive for blurred vision, photophobia and redness. Negative for double vision.   Cardiovascular:  Positive for chest pain. Negative for cyanosis, dyspnea on exertion, irregular heartbeat, leg swelling, near-syncope, orthopnea, palpitations, paroxysmal nocturnal dyspnea and syncope.   Respiratory:  Negative for cough, shortness of breath, sleep disturbances due to breathing and wheezing.    Endocrine: Negative for cold intolerance and heat intolerance.   Musculoskeletal:  Positive for back pain. Negative for falls and myalgias.   Gastrointestinal:  Negative for bloating, constipation, diarrhea, nausea and vomiting.   Genitourinary:  Negative for dysuria and flank pain.   Neurological:  Positive for dizziness and paresthesias. Negative for excessive daytime sleepiness, headaches, light-headedness, loss of balance, numbness and weakness.       Past Medical History:   Diagnosis Date    Aortoiliac occlusive disease (HCC) November 2009    Status post left iliac aneurysm repair    Arthritis          Ascending aorta dilatation (HCC) October 2013    CT angiogram with ascending aorta at 4.4cm.    Carotid stenosis      Less than 50% stenosis    Carotid Stenosis - Mild < 50%     Chest discomfort August 2013    MPI with no evidence of ischemia or infarct, normal LVEF    Dental disorder     Dentures/broken teeth    Dyslipidemia, goal LDL below 70      Gastroesophageal reflux disease     Hearing problem     Hypertension     Nonrheumatic aortic valve insufficiency 8/16/2023    Prostate cancer (HCC) 6/20/2022         Past Surgical History:   Procedure Laterality Date    ANGIOGRAM  11/20/2009    Performed by DANIEL HASKINS at [facility]    FEMORAL ANEURYSM  11/20/2009    Performed by DANIEL HASKINS at [facility] ORS         Current Outpatient Medications   Medication Sig Dispense Refill    amLODIPine (NORVASC) 2.5 MG Tab Take 1 Tablet by mouth 2  times a day.      pantoprazole (PROTONIX) 40 MG Tablet Delayed Response       Magnesium Glycinate 100 MG Cap       vitamin D3 (CHOLECALCIFEROL) 1000 Unit (25 mcg) Tab Take 1,000 Units by mouth every day.      rosuvastatin (CRESTOR) 20 MG Tab Take 1 Tablet by mouth every day. 100 Tablet 3    aspirin 81 MG EC tablet Take 1 Tablet by mouth every day. 100 Tablet 0    finasteride (PROSCAR) 5 MG Tab Take 5 mg by mouth every day.      enalapril (VASOTEC) 10 MG TABS Take 5 mg by mouth every day.      Multiple Vitamin (MULTIVITAMIN PO) Take 1 Tab by mouth every day.       No current facility-administered medications for this visit.         Allergies   Allergen Reactions    Nkda [No Known Drug Allergy]          Family History   Problem Relation Age of Onset    Heart Attack Mother 70    Cancer Father         THROAT         Social History     Socioeconomic History    Marital status:      Spouse name: Not on file    Number of children: Not on file    Years of education: Not on file    Highest education level: Not on file   Occupational History    Not on file   Tobacco Use    Smoking status: Former     Packs/day: 0.50     Years: 2.00     Additional pack years: 0.00     Total pack years: 1.00     Types: Cigarettes     Quit date: 10/16/1973     Years since quittin.8    Smokeless tobacco: Never    Tobacco comments:     Quit    Substance and Sexual Activity    Alcohol use: Yes     Comment: glass of wine daily.    Drug use: No    Sexual activity: Not on file     Comment: , has 4 children, retired.   Other Topics Concern    Not on file   Social History Narrative    Not on file     Social Determinants of Health     Financial Resource Strain: Not on file   Food Insecurity: Not on file   Transportation Needs: Not on file   Physical Activity: Not on file   Stress: Not on file   Social Connections: Not on file   Intimate Partner Violence: Not on file   Housing Stability: Not on file       Physical Exam:  Ambulatory  "Vitals  /62 (BP Location: Left arm, Patient Position: Sitting, BP Cuff Size: Adult)   Pulse 69   Resp 16   Ht 1.626 m (5' 4\")   Wt 73.9 kg (163 lb)   SpO2 95%    Oxygen Therapy:  Pulse Oximetry: 95 %  BP Readings from Last 4 Encounters:   08/16/23 122/62   06/20/22 (!) 140/70   11/10/21 122/82   08/25/14 120/82       Weight/BMI: Body mass index is 27.98 kg/m².  Wt Readings from Last 4 Encounters:   08/16/23 73.9 kg (163 lb)   06/20/22 77.1 kg (170 lb)   11/10/21 77.6 kg (171 lb)   08/25/14 74.4 kg (164 lb)         General: Well appearing and in no apparent distress  Eyes: nl conjunctiva, no icteric sclera  ENT: normal external appearance of ears  Neck: no visible JVP,  no carotid bruits  Lungs: normal respiratory effort, CTAB  Heart: RRR, no murmurs, no rubs or gallops,  no edema bilateral lower extremities.  Abdomen: soft, non tender, non distended  Extremities/MSK: no clubbing, no cyanosis  Neurological: No focal sensory deficits  Psychiatric: Appropriate affect, A/O x 3, intact judgement and insight  Skin: Warm extremities      Lab Data Review:  Lab Results   Component Value Date/Time    CHOLSTRLTOT 194 03/14/2023 06:46 AM     (H) 03/14/2023 06:46 AM    HDL 63 03/14/2023 06:46 AM    TRIGLYCERIDE 96 03/14/2023 06:46 AM       Lab Results   Component Value Date/Time    SODIUM 140 03/14/2023 06:46 AM    POTASSIUM 3.9 03/14/2023 06:46 AM    CHLORIDE 104 03/14/2023 06:46 AM    CO2 25 03/14/2023 06:46 AM    GLUCOSE 108 (H) 03/14/2023 06:46 AM    BUN 21 03/14/2023 06:46 AM    CREATININE 1.18 03/14/2023 06:46 AM     Lab Results   Component Value Date/Time    ALKPHOSPHAT 67 03/14/2023 06:46 AM    ASTSGOT 17 03/14/2023 06:46 AM    ALTSGPT 18 03/14/2023 06:46 AM    TBILIRUBIN 0.7 03/14/2023 06:46 AM      Lab Results   Component Value Date/Time    WBC 6.1 07/11/2023 07:16 AM    HEMOGLOBIN 14.5 07/11/2023 07:16 AM     Lab Results   Component Value Date/Time    HBA1C 6.4 (H) 03/14/2023 06:46 AM    HBA1C 6.1 " (H) 10/15/2022 07:10 AM         Cardiac Imaging and Procedures Review:    EKG dated 8/16/2023: My personal interpretation is sinus rhythm, first-degree AV block    Echo dated 4/16/2021, personally interpreted:   Normal left atrial size and systolic function  Mild aortic stenosis  Mild to moderate aortic insufficiency  Dilated ascending aorta 4.6 cm    Nuclear Perfusion Imaging (7/14/2020):   No evidence of ischemia or infarction  Normal wall motion  No ischemic ECG changes      Radiology test Review:  CT thoracic aorta 10/11/2023:  1. Aneurysmal dilatation of the ascending thoracic aorta to 4.4 cm.  2. Status post stenting of left common iliac and external iliac arteries.     Bilateral carotid artery ultrasound (4/16/2021):   1.  There is a mild amount of atherosclerotic plaque left carotid artery bifurcation and left external carotid artery.  No plaque within the right carotid artery bifurcation is identified.  Plaque is located in the left carotid artery bifurcation and the left external carotid artery.  Plaque characterization:  calcific  2.  There is no evidence of carotid occlusion.   3. Vertebral arteries demonstrate antegrade flow.   4. Diameter reduction in the internal carotid arteries: none. There is no hemodynamically significant stenosis.      Assessment & Plan     1. Aortoiliac occlusive disease (HCC)  EKG    rosuvastatin (CRESTOR) 20 MG Tab    aspirin 81 MG EC tablet      2. Primary hypertension  EKG      3. Dyslipidemia  rosuvastatin (CRESTOR) 20 MG Tab      4. Ascending aorta dilatation (HCC)  EC-ECHOCARDIOGRAM COMPLETE W/O CONT      5. Nonrheumatic aortic valve insufficiency  EC-ECHOCARDIOGRAM COMPLETE W/O CONT      6. Angina of effort (HCC)  EC-ECHOCARDIOGRAM COMPLETE W/O CONT    NM-CARDIAC STRESS TEST      7. Dizziness  NM-CARDIAC STRESS TEST      8. PAD (peripheral artery disease) ([facility])              Shared Medical Decision Making:  We reviewed his ongoing symptoms and risk factors in detail.  Has  established peripheral arterial disease with prior stent placement to iliac artery in 2013.  He is not taking aspirin and LDL is above goal.  With ongoing chest pain, concerns for angina.  Obtain nuclear cardiac stress test to rule out underlying ischemia.  We discussed proceeding with coronary angiogram if it is positive for ischemia.    Obtain transthoracic echocardiogram to monitor progression of aortic insufficiency, aortic stenosis and ascending aorta size.    Start aspirin 81 mg daily.    LDL above goal with established PAD.  Increase rosuvastatin to 20 mg daily.  We discussed goal LDL less than 70.    I have independently interpreted test results and discussed results and management with the patient.    All of patient's excellent questions were answered to the best of my knowledge and to his satisfaction.  It was a pleasure seeing Mr. Jasiel Silver in my clinic today. Return in about 2 months (around 10/16/2023). Patient is aware to call the cardiology clinic with any questions or concerns.      Richardson Alicea MD  [facility]  [facility], [address]  [address] [address]  Phone: [phone]  Fax: [phone]    Please note that this dictation was created using voice recognition software. I have made every reasonable attempt to correct obvious errors, but it is possible there are errors of grammar and possibly content that I did not discover before finalizing the note.

## 2023-08-17 ENCOUNTER — HOSPITAL ENCOUNTER (OUTPATIENT)
Dept: CARDIOLOGY | Facility: MEDICAL CENTER | Age: 78
End: 2023-08-17
Attending: INTERNAL MEDICINE
Payer: MEDICARE

## 2023-08-17 DIAGNOSIS — I20.89 ANGINA OF EFFORT (HCC): ICD-10-CM

## 2023-08-17 DIAGNOSIS — I35.1 NONRHEUMATIC AORTIC VALVE INSUFFICIENCY: ICD-10-CM

## 2023-08-17 DIAGNOSIS — I77.810 ASCENDING AORTA DILATATION (HCC): ICD-10-CM

## 2023-08-17 PROCEDURE — 93306 TTE W/DOPPLER COMPLETE: CPT

## 2023-08-18 LAB
LV EJECT FRACT  99904: 60
LV EJECT FRACT MOD 2C 99903: 59.68
LV EJECT FRACT MOD 4C 99902: 63.61
LV EJECT FRACT MOD BP 99901: 62.17

## 2023-08-18 PROCEDURE — 93306 TTE W/DOPPLER COMPLETE: CPT | Mod: 26 | Performed by: INTERNAL MEDICINE

## 2023-08-24 ENCOUNTER — HOSPITAL ENCOUNTER (OUTPATIENT)
Dept: RADIOLOGY | Facility: MEDICAL CENTER | Age: 78
End: 2023-08-24
Attending: INTERNAL MEDICINE
Payer: MEDICARE

## 2023-08-24 DIAGNOSIS — R42 DIZZINESS: ICD-10-CM

## 2023-08-24 DIAGNOSIS — I20.89 ANGINA OF EFFORT (HCC): ICD-10-CM

## 2023-08-24 PROCEDURE — 78452 HT MUSCLE IMAGE SPECT MULT: CPT

## 2023-08-24 PROCEDURE — 700111 HCHG RX REV CODE 636 W/ 250 OVERRIDE (IP)

## 2023-08-24 RX ORDER — REGADENOSON 0.08 MG/ML
INJECTION, SOLUTION INTRAVENOUS
Status: COMPLETED
Start: 2023-08-24 | End: 2023-08-24

## 2023-08-24 RX ORDER — REGADENOSON 0.08 MG/ML
0.4 INJECTION, SOLUTION INTRAVENOUS ONCE
Status: COMPLETED | OUTPATIENT
Start: 2023-08-24 | End: 2023-08-24

## 2023-08-24 RX ORDER — AMINOPHYLLINE 25 MG/ML
100 INJECTION, SOLUTION INTRAVENOUS
Status: DISCONTINUED | OUTPATIENT
Start: 2023-08-24 | End: 2023-08-25 | Stop reason: HOSPADM

## 2023-08-24 RX ADMIN — REGADENOSON 0.4 MG: 0.08 INJECTION, SOLUTION INTRAVENOUS at 15:28

## 2023-08-25 PROCEDURE — 78452 HT MUSCLE IMAGE SPECT MULT: CPT | Mod: 26 | Performed by: INTERNAL MEDICINE

## 2023-08-25 PROCEDURE — 93018 CV STRESS TEST I&R ONLY: CPT | Performed by: INTERNAL MEDICINE

## 2023-09-13 ENCOUNTER — HOSPITAL ENCOUNTER (OUTPATIENT)
Dept: LAB | Facility: MEDICAL CENTER | Age: 78
End: 2023-09-13
Attending: FAMILY MEDICINE
Payer: MEDICARE

## 2023-09-13 LAB
BASOPHILS # BLD AUTO: 0.2 % (ref 0–1.8)
BASOPHILS # BLD: 0.01 K/UL (ref 0–0.12)
EOSINOPHIL # BLD AUTO: 0.71 K/UL (ref 0–0.51)
EOSINOPHIL NFR BLD: 13 % (ref 0–6.9)
ERYTHROCYTE [DISTWIDTH] IN BLOOD BY AUTOMATED COUNT: 44.7 FL (ref 35.9–50)
HCT VFR BLD AUTO: 41.3 % (ref 42–52)
HGB BLD-MCNC: 13.8 G/DL (ref 14–18)
IMM GRANULOCYTES # BLD AUTO: 0.01 K/UL (ref 0–0.11)
IMM GRANULOCYTES NFR BLD AUTO: 0.2 % (ref 0–0.9)
LYMPHOCYTES # BLD AUTO: 2.08 K/UL (ref 1–4.8)
LYMPHOCYTES NFR BLD: 38 % (ref 22–41)
MCH RBC QN AUTO: 31.5 PG (ref 27–33)
MCHC RBC AUTO-ENTMCNC: 33.4 G/DL (ref 32.3–36.5)
MCV RBC AUTO: 94.3 FL (ref 81.4–97.8)
MONOCYTES # BLD AUTO: 0.45 K/UL (ref 0–0.85)
MONOCYTES NFR BLD AUTO: 8.2 % (ref 0–13.4)
NEUTROPHILS # BLD AUTO: 2.21 K/UL (ref 1.82–7.42)
NEUTROPHILS NFR BLD: 40.4 % (ref 44–72)
NRBC # BLD AUTO: 0 K/UL
NRBC BLD-RTO: 0 /100 WBC (ref 0–0.2)
PLATELET # BLD AUTO: 204 K/UL (ref 164–446)
PMV BLD AUTO: 9.6 FL (ref 9–12.9)
PSA SERPL-MCNC: 0.3 NG/ML (ref 0–4)
RBC # BLD AUTO: 4.38 M/UL (ref 4.7–6.1)
WBC # BLD AUTO: 5.5 K/UL (ref 4.8–10.8)

## 2023-09-13 PROCEDURE — 85025 COMPLETE CBC W/AUTO DIFF WBC: CPT

## 2023-09-13 PROCEDURE — 36415 COLL VENOUS BLD VENIPUNCTURE: CPT

## 2023-09-13 PROCEDURE — 84153 ASSAY OF PSA TOTAL: CPT

## 2023-10-02 ENCOUNTER — TELEPHONE (OUTPATIENT)
Dept: HEALTH INFORMATION MANAGEMENT | Facility: OTHER | Age: 78
End: 2023-10-02
Payer: MEDICARE

## 2023-10-07 DIAGNOSIS — I74.09 AORTOILIAC OCCLUSIVE DISEASE (HCC): Chronic | ICD-10-CM

## 2023-10-09 NOTE — TELEPHONE ENCOUNTER
Is the patient due for a refill? Yes    Was the patient seen the past year? Yes    Date of last office visit: 8/16/2023    Does the patient have an upcoming appointment?  Yes   If yes, When? 11/14/2023    Provider to refill:TW,ADD    Does the patients insurance require a 100 day supply?  Yes

## 2023-10-13 RX ORDER — ASPIRIN 81 MG/1
81 TABLET, COATED ORAL
Qty: 100 TABLET | Refills: 3 | Status: SHIPPED | OUTPATIENT
Start: 2023-10-13

## 2023-11-14 ENCOUNTER — OFFICE VISIT (OUTPATIENT)
Dept: CARDIOLOGY | Facility: MEDICAL CENTER | Age: 78
End: 2023-11-14
Payer: MEDICARE

## 2023-11-14 VITALS
DIASTOLIC BLOOD PRESSURE: 78 MMHG | WEIGHT: 160 LBS | OXYGEN SATURATION: 96 % | HEIGHT: 64 IN | BODY MASS INDEX: 27.31 KG/M2 | RESPIRATION RATE: 16 BRPM | SYSTOLIC BLOOD PRESSURE: 130 MMHG | HEART RATE: 57 BPM

## 2023-11-14 DIAGNOSIS — I74.09 AORTOILIAC OCCLUSIVE DISEASE (HCC): Chronic | ICD-10-CM

## 2023-11-14 DIAGNOSIS — E78.5 DYSLIPIDEMIA, GOAL LDL BELOW 70: ICD-10-CM

## 2023-11-14 DIAGNOSIS — I10 PRIMARY HYPERTENSION: ICD-10-CM

## 2023-11-14 DIAGNOSIS — I73.9 PAD (PERIPHERAL ARTERY DISEASE) (HCC): ICD-10-CM

## 2023-11-14 DIAGNOSIS — I35.1 NONRHEUMATIC AORTIC VALVE INSUFFICIENCY: ICD-10-CM

## 2023-11-14 DIAGNOSIS — I77.810 ASCENDING AORTA DILATATION (HCC): ICD-10-CM

## 2023-11-14 PROCEDURE — 3075F SYST BP GE 130 - 139MM HG: CPT

## 2023-11-14 PROCEDURE — 99214 OFFICE O/P EST MOD 30 MIN: CPT

## 2023-11-14 PROCEDURE — 3078F DIAST BP <80 MM HG: CPT

## 2023-11-14 PROCEDURE — 99212 OFFICE O/P EST SF 10 MIN: CPT

## 2023-11-14 RX ORDER — ENALAPRIL MALEATE 10 MG/1
10 TABLET ORAL DAILY
Qty: 100 TABLET | Refills: 3 | Status: SHIPPED | OUTPATIENT
Start: 2023-11-14 | End: 2023-11-22 | Stop reason: DRUGHIGH

## 2023-11-14 ASSESSMENT — ENCOUNTER SYMPTOMS
ORTHOPNEA: 0
MUSCULOSKELETAL NEGATIVE: 1
NERVOUS/ANXIOUS: 0
CONSTITUTIONAL NEGATIVE: 1
PND: 0
PALPITATIONS: 1
GASTROINTESTINAL NEGATIVE: 1
EYES NEGATIVE: 1
DEPRESSION: 0
NEUROLOGICAL NEGATIVE: 1
SHORTNESS OF BREATH: 0

## 2023-11-14 ASSESSMENT — FIBROSIS 4 INDEX: FIB4 SCORE: 1.532064692570852969

## 2023-11-14 NOTE — PROGRESS NOTES
Chief Complaint   Patient presents with    Hypertension    Aortic Stenosis     F/v dx: Aortoiliac occlusive disease (HCC)       Eusebia Silver is a 78 y.o. male who presents today for follow up. They have a history of aortic aneurysm, dyslipidemia, hypertension, aortic valve insufficiency, PAD    Last seen by Dr. Alicea on 8/16/2023, at that visit he was having anginal symptoms, a cardiac stress test and echocardiogram were ordered, he was also started on aspirin and had his rosuvastatin dose increased for goal LDL of less than 70    Today 11/14/2023 he has been having left breast pain.  He points out that his left breast is larger than his right. pain in his left breast that is not associated with activity.  He has infrequent palpitations, denies shortness of breath, does have some mild leg swelling    Activity: he plays tennis 2x per week for 2-3 hours      Past Medical History:   Diagnosis Date    Aortoiliac occlusive disease (HCC) November 2009    Status post left iliac aneurysm repair    Arthritis          Ascending aorta dilatation (HCC) October 2013    CT angiogram with ascending aorta at 4.4cm.    Carotid stenosis      Less than 50% stenosis    Carotid Stenosis - Mild < 50%     Chest discomfort August 2013    MPI with no evidence of ischemia or infarct, normal LVEF    Dental disorder     Dentures/broken teeth    Dyslipidemia, goal LDL below 70      Gastroesophageal reflux disease     Hearing problem     Hypertension     Nonrheumatic aortic valve insufficiency 8/16/2023    Prostate cancer (HCC) 6/20/2022     Past Surgical History:   Procedure Laterality Date    ANGIOGRAM  11/20/2009    Performed by DANIEL HASKINS at [facility]    FEMORAL ANEURYSM  11/20/2009    Performed by DANIEL HASKINS at SUR[facility]ERY [facility] ORS     Family History   Problem Relation Age of Onset    Heart Attack Mother 70    Cancer Father         THROAT     Social History     Socioeconomic History     Marital status:      Spouse name: Not on file    Number of children: Not on file    Years of education: Not on file    Highest education level: Not on file   Occupational History    Not on file   Tobacco Use    Smoking status: Former     Current packs/day: 0.00     Average packs/day: 0.5 packs/day for 2.0 years (1.0 ttl pk-yrs)     Types: Cigarettes     Start date: 10/16/1971     Quit date: 10/16/1973     Years since quittin.1    Smokeless tobacco: Never    Tobacco comments:     Quit    Substance and Sexual Activity    Alcohol use: Yes     Comment: glass of wine daily.    Drug use: No    Sexual activity: Not on file     Comment: , has 4 children, retired.   Other Topics Concern    Not on file   Social History Narrative    Not on file     Social Determinants of Health     Financial Resource Strain: Not on file   Food Insecurity: Not on file   Transportation Needs: Not on file   Physical Activity: Not on file   Stress: Not on file   Social Connections: Not on file   Intimate Partner Violence: Not on file   Housing Stability: Not on file     Allergies   Allergen Reactions    Nkda [No Known Drug Allergy]      Outpatient Encounter Medications as of 2023   Medication Sig Dispense Refill    ASPIRIN LOW DOSE 81 MG EC tablet TAKE 1 TABLET BY MOUTH EVERY  Tablet 3    amLODIPine (NORVASC) 2.5 MG Tab Take 1 Tablet by mouth 2 times a day.      pantoprazole (PROTONIX) 40 MG Tablet Delayed Response       Magnesium Glycinate 100 MG Cap       vitamin D3 (CHOLECALCIFEROL) 1000 Unit (25 mcg) Tab Take 1,000 Units by mouth every day.      rosuvastatin (CRESTOR) 20 MG Tab Take 1 Tablet by mouth every day. 100 Tablet 3    finasteride (PROSCAR) 5 MG Tab Take 5 mg by mouth every day.      enalapril (VASOTEC) 10 MG TABS Take 5 mg by mouth every day.      Multiple Vitamin (MULTIVITAMIN PO) Take 1 Tab by mouth every day.       No facility-administered encounter medications on file as of 2023.  "    Review of Systems   Constitutional: Negative.    HENT: Negative.     Eyes: Negative.    Respiratory:  Negative for shortness of breath.    Cardiovascular:  Positive for palpitations and leg swelling. Negative for chest pain, orthopnea and PND.        Left breast pain   Gastrointestinal: Negative.    Genitourinary: Negative.    Musculoskeletal: Negative.    Skin: Negative.    Neurological: Negative.    Endo/Heme/Allergies: Negative.    Psychiatric/Behavioral:  Negative for depression. The patient is not nervous/anxious.               Objective     /78 (BP Location: Left arm, Patient Position: Sitting, BP Cuff Size: Adult)   Pulse (!) 57   Resp 16   Ht 1.626 m (5' 4\")   Wt 72.6 kg (160 lb)   SpO2 96%   BMI 27.46 kg/m²     Physical Exam  Constitutional:       Appearance: Normal appearance.   HENT:      Head: Normocephalic.   Neck:      Vascular: No JVD.   Cardiovascular:      Rate and Rhythm: Normal rate and regular rhythm.      Pulses: Normal pulses.      Heart sounds: Murmur heard.      Systolic murmur is present with a grade of 3/6.      No friction rub.   Pulmonary:      Effort: Pulmonary effort is normal.      Breath sounds: Normal breath sounds.   Abdominal:      Palpations: Abdomen is soft.   Musculoskeletal:         General: Normal range of motion.      Right lower le+ Edema present.      Left lower le+ Edema present.   Skin:     General: Skin is warm and dry.   Neurological:      General: No focal deficit present.      Mental Status: He is alert and oriented to person, place, and time.   Psychiatric:         Mood and Affect: Mood normal.         Behavior: Behavior normal.            Lab Results   Component Value Date/Time    CHOLSTRLTOT 194 2023 06:46 AM     (H) 2023 06:46 AM    HDL 63 2023 06:46 AM    TRIGLYCERIDE 96 2023 06:46 AM       Lab Results   Component Value Date/Time    SODIUM 140 2023 06:46 AM    POTASSIUM 3.9 2023 06:46 AM    " CHLORIDE 104 03/14/2023 06:46 AM    CO2 25 03/14/2023 06:46 AM    GLUCOSE 108 (H) 03/14/2023 06:46 AM    BUN 21 03/14/2023 06:46 AM    CREATININE 1.18 03/14/2023 06:46 AM     Lab Results   Component Value Date/Time    ALKPHOSPHAT 67 03/14/2023 06:46 AM    ASTSGOT 17 03/14/2023 06:46 AM    ALTSGPT 18 03/14/2023 06:46 AM    TBILIRUBIN 0.7 03/14/2023 06:46 AM      Cardiac stress test 8/24/2023   Myocardial Perfusion   Report   NUCLEAR IMAGING INTERPRETATION   No evidence of significant jeopardized viable myocardium or prior myocardial    infarction.   Normal left ventricular size, ejection fraction, and wall motion.   ECG INTERPRETATION   Nondiagnostic due to resting ECG.    Echocardiogram 8/17/2023  CONCLUSIONS  Compared to the prior study on 04/16/2021, ascending aorta size has   increased.  The left ventricular ejection fraction is estimated to be 60%.  Tricuspid aortic valve.  Mild-moderate aortic insufficiency.  The ascending aorta is dilated with a diameter of 4.8 cm.    Assessment & Plan     1. Aortoiliac occlusive disease (HCC)        2. Ascending aorta dilatation (HCC)        3. Dyslipidemia, goal LDL below 70        4. Primary hypertension        5. Nonrheumatic aortic valve insufficiency        6. PAD (peripheral artery disease) (HCC)            Medical Decision Making: Today's Assessment/Status/Plan:        Aneurysm of ascending aorta with aortic insufficiency  -Obtain CTA of aorta in February  -Echo on 8/17/2023 showed an ascending aorta of 4.8 cm  -Alternate CT scans with echocardiograms to monitor aortic insufficiency    Hypertension  -Borderline control  -Stop amlodipine due to possible contribution to the bilateral lower extremity edema, increased dose of enalapril to 10 mg daily  - goal < 130/80  - check in in 2 weeks with home blood pressure readings     Hyperlipidemia with aortoiliac occlusive disease and PAD  -Repeat lipid panel ordered, for now continue with atorvastatin 20 mg daily    Follow-up  with KATELYNN Murrell in February after the CTA    This note was dictated using Dragon speech recognition software.

## 2023-11-14 NOTE — PATIENT INSTRUCTIONS
Follow up with Shruthi [facility] in February after CT scan  Stop amlodipine  Increase dose of enalapril to 10 mg daily  Check BP daily 2 hours after medications and keep log of measurements

## 2023-11-22 ENCOUNTER — PATIENT MESSAGE (OUTPATIENT)
Dept: CARDIOLOGY | Facility: MEDICAL CENTER | Age: 78
End: 2023-11-22
Payer: MEDICARE

## 2023-11-22 DIAGNOSIS — I10 PRIMARY HYPERTENSION: ICD-10-CM

## 2023-11-22 RX ORDER — ENALAPRIL MALEATE 20 MG/1
20 TABLET ORAL DAILY
Qty: 100 TABLET | Refills: 3 | Status: SHIPPED | OUTPATIENT
Start: 2023-11-22

## 2023-11-23 NOTE — PATIENT COMMUNICATION
MONTY Awad  You1 minute ago (4:10 PM)     Increase enalapril to 20 mg QD and follow up with BP readings early next week with Shruthi. Order bmp in 1 month. SC     Recommendations sent to patient via [employer].

## 2023-11-27 ENCOUNTER — PATIENT MESSAGE (OUTPATIENT)
Dept: CARDIOLOGY | Facility: MEDICAL CENTER | Age: 78
End: 2023-11-27
Payer: MEDICARE

## 2023-11-27 DIAGNOSIS — I10 PRIMARY HYPERTENSION: ICD-10-CM

## 2023-11-27 RX ORDER — HYDROCHLOROTHIAZIDE 25 MG/1
25 TABLET ORAL DAILY
Qty: 100 TABLET | Refills: 3 | Status: SHIPPED | OUTPATIENT
Start: 2023-11-27

## 2023-11-27 NOTE — TELEPHONE ENCOUNTER
Thank you, did he get his labs drawn?    I would like to have him start HCTZ 25 mg daily and check on his BPs in 1 week

## 2023-12-01 ENCOUNTER — HOSPITAL ENCOUNTER (OUTPATIENT)
Dept: LAB | Facility: MEDICAL CENTER | Age: 78
End: 2023-12-01
Payer: MEDICARE

## 2023-12-01 DIAGNOSIS — I10 PRIMARY HYPERTENSION: ICD-10-CM

## 2023-12-01 DIAGNOSIS — E78.5 DYSLIPIDEMIA, GOAL LDL BELOW 70: ICD-10-CM

## 2023-12-01 LAB
ANION GAP SERPL CALC-SCNC: 9 MMOL/L (ref 7–16)
BUN SERPL-MCNC: 19 MG/DL (ref 8–22)
CALCIUM SERPL-MCNC: 9.7 MG/DL (ref 8.5–10.5)
CHLORIDE SERPL-SCNC: 101 MMOL/L (ref 96–112)
CHOLEST SERPL-MCNC: 176 MG/DL (ref 100–199)
CO2 SERPL-SCNC: 30 MMOL/L (ref 20–33)
CREAT SERPL-MCNC: 1.41 MG/DL (ref 0.5–1.4)
GFR SERPLBLD CREATININE-BSD FMLA CKD-EPI: 51 ML/MIN/1.73 M 2
GLUCOSE SERPL-MCNC: 107 MG/DL (ref 65–99)
HDLC SERPL-MCNC: 74 MG/DL
LDLC SERPL CALC-MCNC: 83 MG/DL
POTASSIUM SERPL-SCNC: 4 MMOL/L (ref 3.6–5.5)
SODIUM SERPL-SCNC: 140 MMOL/L (ref 135–145)
TRIGL SERPL-MCNC: 94 MG/DL (ref 0–149)

## 2023-12-01 PROCEDURE — 80061 LIPID PANEL: CPT

## 2023-12-01 PROCEDURE — 80048 BASIC METABOLIC PNL TOTAL CA: CPT

## 2023-12-01 PROCEDURE — 36415 COLL VENOUS BLD VENIPUNCTURE: CPT

## 2023-12-03 ENCOUNTER — PATIENT MESSAGE (OUTPATIENT)
Dept: CARDIOLOGY | Facility: MEDICAL CENTER | Age: 78
End: 2023-12-03
Payer: MEDICARE

## 2023-12-05 ENCOUNTER — TELEPHONE (OUTPATIENT)
Dept: CARDIOLOGY | Facility: MEDICAL CENTER | Age: 78
End: 2023-12-05
Payer: MEDICARE

## 2023-12-05 NOTE — TELEPHONE ENCOUNTER
----- Message from MONTY Rosen sent at 12/5/2023  9:11 AM PST -----  Increase in creatinine and decrease in the GFR may be transient, lets have him recheck his BMP in 2 weeks

## 2024-01-09 ENCOUNTER — HOSPITAL ENCOUNTER (OUTPATIENT)
Dept: LAB | Facility: MEDICAL CENTER | Age: 79
End: 2024-01-09
Payer: MEDICARE

## 2024-01-09 ENCOUNTER — APPOINTMENT (OUTPATIENT)
Dept: LAB | Facility: MEDICAL CENTER | Age: 79
End: 2024-01-09
Payer: MEDICARE

## 2024-01-09 PROCEDURE — 36415 COLL VENOUS BLD VENIPUNCTURE: CPT

## 2024-01-31 ENCOUNTER — TELEPHONE (OUTPATIENT)
Dept: CARDIOLOGY | Facility: MEDICAL CENTER | Age: 79
End: 2024-01-31
Payer: MEDICARE

## 2024-01-31 DIAGNOSIS — I10 PRIMARY HYPERTENSION: ICD-10-CM

## 2024-01-31 NOTE — TELEPHONE ENCOUNTER
----- Message from MONTY Awad sent at 1/31/2024 11:16 AM PST -----  Regarding: RE: Labs needed for patient  Please place bmp for [facility]  ----- Message -----  From: Amy Santamaria  Sent: 1/31/2024  10:04 AM PST  To: MONTY Rosen  Subject: Labs needed for patient                          Good morning,     This patient ( [id number] ) has a CT with IV contrast, we need to make sure kidney function is okay before his exam. Would you be able to order either a Bun/Creatinine or a BMP? I will keep an eye out for these orders and contact patient myself to make him aware.     Thank You!   KUSUM Xavier

## 2024-02-01 ENCOUNTER — HOSPITAL ENCOUNTER (OUTPATIENT)
Dept: LAB | Facility: MEDICAL CENTER | Age: 79
End: 2024-02-01
Payer: MEDICARE

## 2024-02-01 ENCOUNTER — HOSPITAL ENCOUNTER (OUTPATIENT)
Dept: LAB | Facility: MEDICAL CENTER | Age: 79
End: 2024-02-01
Attending: FAMILY MEDICINE
Payer: MEDICARE

## 2024-02-01 DIAGNOSIS — I10 PRIMARY HYPERTENSION: ICD-10-CM

## 2024-02-01 LAB
ALBUMIN SERPL BCP-MCNC: 4.6 G/DL (ref 3.2–4.9)
ALBUMIN/GLOB SERPL: 1.7 G/DL
ALP SERPL-CCNC: 59 U/L (ref 30–99)
ALT SERPL-CCNC: 14 U/L (ref 2–50)
ANION GAP SERPL CALC-SCNC: 10 MMOL/L (ref 7–16)
ANION GAP SERPL CALC-SCNC: 10 MMOL/L (ref 7–16)
AST SERPL-CCNC: 23 U/L (ref 12–45)
BASOPHILS # BLD AUTO: 0.4 % (ref 0–1.8)
BASOPHILS # BLD: 0.02 K/UL (ref 0–0.12)
BILIRUB SERPL-MCNC: 1 MG/DL (ref 0.1–1.5)
BUN SERPL-MCNC: 20 MG/DL (ref 8–22)
BUN SERPL-MCNC: 20 MG/DL (ref 8–22)
CALCIUM ALBUM COR SERPL-MCNC: 9.1 MG/DL (ref 8.5–10.5)
CALCIUM SERPL-MCNC: 9.5 MG/DL (ref 8.5–10.5)
CALCIUM SERPL-MCNC: 9.6 MG/DL (ref 8.5–10.5)
CHLORIDE SERPL-SCNC: 102 MMOL/L (ref 96–112)
CHLORIDE SERPL-SCNC: 102 MMOL/L (ref 96–112)
CO2 SERPL-SCNC: 29 MMOL/L (ref 20–33)
CO2 SERPL-SCNC: 29 MMOL/L (ref 20–33)
CREAT SERPL-MCNC: 1.26 MG/DL (ref 0.5–1.4)
CREAT SERPL-MCNC: 1.29 MG/DL (ref 0.5–1.4)
EOSINOPHIL # BLD AUTO: 0.19 K/UL (ref 0–0.51)
EOSINOPHIL NFR BLD: 3.4 % (ref 0–6.9)
ERYTHROCYTE [DISTWIDTH] IN BLOOD BY AUTOMATED COUNT: 44.5 FL (ref 35.9–50)
GFR SERPLBLD CREATININE-BSD FMLA CKD-EPI: 57 ML/MIN/1.73 M 2
GFR SERPLBLD CREATININE-BSD FMLA CKD-EPI: 58 ML/MIN/1.73 M 2
GLOBULIN SER CALC-MCNC: 2.7 G/DL (ref 1.9–3.5)
GLUCOSE SERPL-MCNC: 115 MG/DL (ref 65–99)
GLUCOSE SERPL-MCNC: 115 MG/DL (ref 65–99)
HCT VFR BLD AUTO: 46 % (ref 42–52)
HGB BLD-MCNC: 15.4 G/DL (ref 14–18)
IMM GRANULOCYTES # BLD AUTO: 0.02 K/UL (ref 0–0.11)
IMM GRANULOCYTES NFR BLD AUTO: 0.4 % (ref 0–0.9)
LYMPHOCYTES # BLD AUTO: 2.32 K/UL (ref 1–4.8)
LYMPHOCYTES NFR BLD: 41.5 % (ref 22–41)
MCH RBC QN AUTO: 31.7 PG (ref 27–33)
MCHC RBC AUTO-ENTMCNC: 33.5 G/DL (ref 32.3–36.5)
MCV RBC AUTO: 94.7 FL (ref 81.4–97.8)
MONOCYTES # BLD AUTO: 0.46 K/UL (ref 0–0.85)
MONOCYTES NFR BLD AUTO: 8.2 % (ref 0–13.4)
NEUTROPHILS # BLD AUTO: 2.58 K/UL (ref 1.82–7.42)
NEUTROPHILS NFR BLD: 46.1 % (ref 44–72)
NRBC # BLD AUTO: 0 K/UL
NRBC BLD-RTO: 0 /100 WBC (ref 0–0.2)
PLATELET # BLD AUTO: 198 K/UL (ref 164–446)
PMV BLD AUTO: 9.8 FL (ref 9–12.9)
POTASSIUM SERPL-SCNC: 4.2 MMOL/L (ref 3.6–5.5)
POTASSIUM SERPL-SCNC: 4.3 MMOL/L (ref 3.6–5.5)
PROT SERPL-MCNC: 7.3 G/DL (ref 6–8.2)
RBC # BLD AUTO: 4.86 M/UL (ref 4.7–6.1)
SODIUM SERPL-SCNC: 141 MMOL/L (ref 135–145)
SODIUM SERPL-SCNC: 141 MMOL/L (ref 135–145)
WBC # BLD AUTO: 5.6 K/UL (ref 4.8–10.8)

## 2024-02-01 PROCEDURE — 80053 COMPREHEN METABOLIC PANEL: CPT

## 2024-02-01 PROCEDURE — 85025 COMPLETE CBC W/AUTO DIFF WBC: CPT

## 2024-02-01 PROCEDURE — 36415 COLL VENOUS BLD VENIPUNCTURE: CPT

## 2024-02-01 PROCEDURE — 80048 BASIC METABOLIC PNL TOTAL CA: CPT

## 2024-02-06 ENCOUNTER — TELEPHONE (OUTPATIENT)
Dept: CARDIOLOGY | Facility: MEDICAL CENTER | Age: 79
End: 2024-02-06

## 2024-02-06 ENCOUNTER — APPOINTMENT (OUTPATIENT)
Dept: RADIOLOGY | Facility: MEDICAL CENTER | Age: 79
End: 2024-02-06
Payer: MEDICARE

## 2024-02-06 NOTE — TELEPHONE ENCOUNTER
Phone Number Called: [phone]     Call outcome: Did not leave a detailed message. Requested patient to call back.    Message: Called to discuss question about liver and test today  MyChart message sent

## 2024-02-06 NOTE — TELEPHONE ENCOUNTER
Caller: Jasiel Paz Terrobias    Topic/issue: MEDICAL ADVICE    Jasiel would like to know if the testing that he has schedule for this afternoon is safe for him to complete with the information that he received regarding his liver levels. Please advise.    Thank you,  Eladio PHILLIPS    Callback Number: [phone]

## 2024-02-16 ENCOUNTER — HOSPITAL ENCOUNTER (OUTPATIENT)
Dept: RADIOLOGY | Facility: MEDICAL CENTER | Age: 79
End: 2024-02-16
Payer: MEDICARE

## 2024-02-16 DIAGNOSIS — I77.810 ASCENDING AORTA DILATATION (HCC): ICD-10-CM

## 2024-02-16 PROCEDURE — 71275 CT ANGIOGRAPHY CHEST: CPT

## 2024-02-16 PROCEDURE — 700117 HCHG RX CONTRAST REV CODE 255

## 2024-02-16 RX ADMIN — IOHEXOL 100 ML: 350 INJECTION, SOLUTION INTRAVENOUS at 09:37

## 2024-02-22 ENCOUNTER — OFFICE VISIT (OUTPATIENT)
Dept: CARDIOLOGY | Facility: MEDICAL CENTER | Age: 79
End: 2024-02-22
Payer: MEDICARE

## 2024-02-22 VITALS
DIASTOLIC BLOOD PRESSURE: 80 MMHG | WEIGHT: 167 LBS | HEART RATE: 59 BPM | OXYGEN SATURATION: 98 % | SYSTOLIC BLOOD PRESSURE: 116 MMHG | BODY MASS INDEX: 28.51 KG/M2 | RESPIRATION RATE: 16 BRPM | HEIGHT: 64 IN

## 2024-02-22 DIAGNOSIS — I73.9 PAD (PERIPHERAL ARTERY DISEASE) (HCC): ICD-10-CM

## 2024-02-22 DIAGNOSIS — E78.5 DYSLIPIDEMIA: ICD-10-CM

## 2024-02-22 DIAGNOSIS — I71.40 ABDOMINAL AORTIC ANEURYSM (AAA) WITHOUT RUPTURE, UNSPECIFIED PART (HCC): ICD-10-CM

## 2024-02-22 DIAGNOSIS — I77.810 ASCENDING AORTA DILATATION (HCC): ICD-10-CM

## 2024-02-22 DIAGNOSIS — I74.09 AORTOILIAC OCCLUSIVE DISEASE (HCC): Chronic | ICD-10-CM

## 2024-02-22 PROCEDURE — 99214 OFFICE O/P EST MOD 30 MIN: CPT

## 2024-02-22 PROCEDURE — 99212 OFFICE O/P EST SF 10 MIN: CPT

## 2024-02-22 PROCEDURE — 3074F SYST BP LT 130 MM HG: CPT

## 2024-02-22 PROCEDURE — 3079F DIAST BP 80-89 MM HG: CPT

## 2024-02-22 RX ORDER — ROSUVASTATIN CALCIUM 40 MG/1
40 TABLET, COATED ORAL DAILY
Qty: 100 TABLET | Refills: 3 | Status: SHIPPED | OUTPATIENT
Start: 2024-02-22

## 2024-02-22 ASSESSMENT — ENCOUNTER SYMPTOMS
EYES NEGATIVE: 1
DEPRESSION: 0
GASTROINTESTINAL NEGATIVE: 1
MUSCULOSKELETAL NEGATIVE: 1
ORTHOPNEA: 0
CONSTITUTIONAL NEGATIVE: 1
SHORTNESS OF BREATH: 0
NERVOUS/ANXIOUS: 0
NEUROLOGICAL NEGATIVE: 1
PALPITATIONS: 0
PND: 0

## 2024-02-22 ASSESSMENT — FIBROSIS 4 INDEX: FIB4 SCORE: 2.42

## 2024-02-22 NOTE — PROGRESS NOTES
Chief Complaint   Patient presents with    Hypertension       Subjective     Jasiel Silver is a 78 y.o. male who presents today  for follow up. They have a history of aortic aneurysm, dyslipidemia, hypertension, aortic valve insufficiency, PAD     Seen by Dr. Alicea on 8/16/2023, at that visit he was having anginal symptoms, a cardiac stress test and echocardiogram were ordered, he was also started on aspirin and had his rosuvastatin dose increased for goal LDL of less than 70      Seen by myself on 11/14/2023 he has been having left breast pain.  He points out that his left breast is larger than his right. pain in his left breast that is not associated with activity.  He has infrequent palpitations, denies shortness of breath, does have some mild leg swelling. He plays tennis 2x per week for 2-3 hours.  I ordered a CT aorta and lipid panel.     Today 2/22/2024 he has been feeling really well overall.  Denies chest pain, shortness of breath, or other cardiac symptoms    Past Medical History:   Diagnosis Date    Aortoiliac occlusive disease (HCC) November 2009    Status post left iliac aneurysm repair    Arthritis          Ascending aorta dilatation (HCC) October 2013    CT angiogram with ascending aorta at 4.4cm.    Carotid stenosis      Less than 50% stenosis    Carotid Stenosis - Mild < 50%     Chest discomfort August 2013    MPI with no evidence of ischemia or infarct, normal LVEF    Dental disorder     Dentures/broken teeth    Dyslipidemia, goal LDL below 70      Gastroesophageal reflux disease     Hearing problem     Hypertension     Nonrheumatic aortic valve insufficiency 8/16/2023    Prostate cancer (HCC) 6/20/2022     Past Surgical History:   Procedure Laterality Date    ANGIOGRAM  11/20/2009    Performed by DANIEL HASKINS at SURGERY [facility]    FEMORAL ANEURYSM  11/20/2009    Performed by DANIEL HASKINS at SURGERY [facility]     Family History   Problem Relation Age of Onset    Heart  Attack Mother 70    Cancer Father         THROAT     Social History     Socioeconomic History    Marital status:      Spouse name: Not on file    Number of children: Not on file    Years of education: Not on file    Highest education level: Not on file   Occupational History    Not on file   Tobacco Use    Smoking status: Former     Current packs/day: 0.00     Average packs/day: 0.5 packs/day for 2.0 years (1.0 ttl pk-yrs)     Types: Cigarettes     Start date: 10/16/1971     Quit date: 10/16/1973     Years since quittin.3    Smokeless tobacco: Never    Tobacco comments:     Quit    Substance and Sexual Activity    Alcohol use: Not Currently     Alcohol/week: 0.6 oz     Types: 1 Glasses of wine per week     Comment: occ    Drug use: No    Sexual activity: Not on file     Comment: , has 4 children, retired.   Other Topics Concern    Not on file   Social History Narrative    Not on file     Social Determinants of Health     Financial Resource Strain: Not on file   Food Insecurity: Not on file   Transportation Needs: Not on file   Physical Activity: Not on file   Stress: Not on file   Social Connections: Not on file   Intimate Partner Violence: Not on file   Housing Stability: Not on file     Allergies   Allergen Reactions    Nkda [No Known Drug Allergy]      Outpatient Encounter Medications as of 2024   Medication Sig Dispense Refill    rosuvastatin (CRESTOR) 40 MG tablet Take 1 Tablet by mouth every day. 100 Tablet 3    hydroCHLOROthiazide 25 MG Tab Take 1 Tablet by mouth every day. 100 Tablet 3    enalapril (VASOTEC) 20 MG tablet Take 1 Tablet by mouth every day. 100 Tablet 3    ASPIRIN LOW DOSE 81 MG EC tablet TAKE 1 TABLET BY MOUTH EVERY  Tablet 3    pantoprazole (PROTONIX) 40 MG Tablet Delayed Response       Magnesium Glycinate 100 MG Cap       vitamin D3 (CHOLECALCIFEROL) 1000 Unit (25 mcg) Tab Take 1,000 Units by mouth every day.      finasteride (PROSCAR) 5 MG Tab Take 5 mg by  "mouth every day.      Multiple Vitamin (MULTIVITAMIN PO) Take 1 Tab by mouth every day.      [DISCONTINUED] rosuvastatin (CRESTOR) 20 MG Tab Take 1 Tablet by mouth every day. 100 Tablet 3     No facility-administered encounter medications on file as of 2/22/2024.     Review of Systems   Constitutional: Negative.    HENT: Negative.     Eyes: Negative.    Respiratory:  Negative for shortness of breath.    Cardiovascular:  Negative for chest pain, palpitations, orthopnea, leg swelling and PND.   Gastrointestinal: Negative.    Genitourinary: Negative.    Musculoskeletal: Negative.    Skin: Negative.    Neurological: Negative.    Endo/Heme/Allergies: Negative.    Psychiatric/Behavioral:  Negative for depression. The patient is not nervous/anxious.               Objective     /80 (BP Location: Left arm, Patient Position: Sitting, BP Cuff Size: Adult)   Pulse (!) 59   Resp 16   Ht 1.626 m (5' 4\")   Wt 75.8 kg (167 lb)   SpO2 98%   BMI 28.67 kg/m²     Physical Exam  Constitutional:       Appearance: Normal appearance.   HENT:      Head: Normocephalic.   Neck:      Vascular: No JVD.   Cardiovascular:      Rate and Rhythm: Normal rate and regular rhythm.      Pulses: Normal pulses.      Heart sounds: Murmur heard.      No systolic murmur is present.      Diastolic murmur is present with a grade of 3/4.      No friction rub.   Pulmonary:      Effort: Pulmonary effort is normal.      Breath sounds: Normal breath sounds.   Abdominal:      Palpations: Abdomen is soft.   Musculoskeletal:         General: Normal range of motion.      Right lower leg: No edema.      Left lower leg: No edema.   Skin:     General: Skin is warm and dry.   Neurological:      General: No focal deficit present.      Mental Status: He is alert and oriented to person, place, and time.   Psychiatric:         Mood and Affect: Mood normal.         Behavior: Behavior normal.            Lab Results   Component Value Date/Time    CHOLSTRLTOT 176 " 12/01/2023 06:21 AM    LDL 83 12/01/2023 06:21 AM    HDL 74 12/01/2023 06:21 AM    TRIGLYCERIDE 94 12/01/2023 06:21 AM       Lab Results   Component Value Date/Time    SODIUM 141 02/01/2024 06:27 AM    SODIUM 141 02/01/2024 06:27 AM    POTASSIUM 4.3 02/01/2024 06:27 AM    POTASSIUM 4.2 02/01/2024 06:27 AM    CHLORIDE 102 02/01/2024 06:27 AM    CHLORIDE 102 02/01/2024 06:27 AM    CO2 29 02/01/2024 06:27 AM    CO2 29 02/01/2024 06:27 AM    GLUCOSE 115 (H) 02/01/2024 06:27 AM    GLUCOSE 115 (H) 02/01/2024 06:27 AM    BUN 20 02/01/2024 06:27 AM    BUN 20 02/01/2024 06:27 AM    CREATININE 1.29 02/01/2024 06:27 AM    CREATININE 1.26 02/01/2024 06:27 AM     Lab Results   Component Value Date/Time    ALKPHOSPHAT 59 02/01/2024 06:27 AM    ASTSGOT 23 02/01/2024 06:27 AM    ALTSGPT 14 02/01/2024 06:27 AM    TBILIRUBIN 1.0 02/01/2024 06:27 AM          CT aorta 2/16/2024    1. 5 cm ascending thoracic aortic aneurysm.  2. 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  3. Cholelithiasis.  4. Simple, Bosniak 1, left renal cysts which do not require follow-up.  5. Colonic diverticulosis.    Assessment & Plan     1. Ascending aorta dilatation (HCC)  Referral to Cardiothoracic Surgery      2. Aortoiliac occlusive disease (HCC)  rosuvastatin (CRESTOR) 40 MG tablet      3. Dyslipidemia  rosuvastatin (CRESTOR) 40 MG tablet      4. PAD (peripheral artery disease) (HCC)        5. Abdominal aortic aneurysm (AAA) without rupture, unspecified part (HCC)            Medical Decision Making: Today's Assessment/Status/Plan:        HCC Gap Form    Diagnosis to address: I74.09 - Aortoiliac occlusive disease (HCC)  Assessment and plan: Chronic, stable. Continue with current defined treatment plan: continue ASA and rosuvastatin. Follow-up at least annually.  Diagnosis: I73.9 - PAD (peripheral artery disease) (HCC)  Assessment and plan: Chronic, stable. Continue with current defined treatment plan:  continue ASA and rosuvastatin. Follow-up at least annually.  Diagnosis: I77.810 - Ascending aorta dilatation (HCC)  Assessment and plan: Chronic, exacerbated. Treatment and follow up: referral placed to CT surgery     Diagnosis: I71.40 - Abdominal aortic aneurysm (AAA) without rupture, unspecified part (HCC)  Assessment and plan: Chronic, stable. Continue with current defined treatment plan: continue surveilance. Follow-up at least annually.  Last edited 02/22/24 08:36 PST by XAVIER Rosen.           Aneurysm of ascending aorta with aortic insufficiency  -CT showed 5 cm aneurysm  -Echo on 8/17/2023 showed an ascending aorta of 4.8 cm  -placed referral to CTS   -We discussed avoiding lifting heavy weights and avoiding intense physical activity     Hypertension  -Good control  -Continue current regimen  - goal < 130/80  - check in in 2 weeks with home blood pressure readings     Hyperlipidemia with aortoiliac occlusive disease and PAD  -Increased rosuvastatin dose to 40 mg daily, repeat lipid panel in 3 months  -LDL goal less than 70    Additionally we discussed patient's incidental findings on his CT scan including pancreatic cyst, recommend following up with Dr. Ferguson     Follow-up with KATELYNN Murrlel in 3 months     This note was dictated using Dragon speech recognition software.

## 2024-02-27 ENCOUNTER — TELEPHONE (OUTPATIENT)
Dept: CARDIOTHORACIC SURGERY | Facility: MEDICAL CENTER | Age: 79
End: 2024-02-27
Payer: MEDICARE

## 2024-02-27 NOTE — PROGRESS NOTES
REFERRING PHYSICIAN: KATELYNN Murrell.     CONSULTING PHYSICIAN: Taiwo Iyer DO     CHIEF COMPLAINT: Aortic aneurysm    HISTORY OF PRESENT ILLNESS: The patient is a 78 y.o. male with past medical history of hypertension, dyslipidemia, aortic insufficiency, prostate cancer, peripheral artery disease, and chronic kidney disease who presents with chest pain. He denies shortness of breath, orthopnea, PND, dizziness, and syncope. He underwent work up for chest pain and an ascending aortic aneurysm was discovered. He plays tennis twice a week for 2-3 hours.       PAST MEDICAL HISTORY:   Active Ambulatory Problems     Diagnosis Date Noted    Abdominal aortic aneurysm (HCC) 06/10/2011    HTN (hypertension) 06/10/2011    Dyslipidemia, goal LDL below 70 06/10/2011    Carotid Stenosis - Mild < 50% 06/10/2011    Aortoiliac occlusive disease (HCC)     Gastroesophageal reflux disease     Chest discomfort     Hearing problem     Ascending aorta dilatation ([facility]) 10/16/2013    Pre-diabetes 11/10/2021    Elevated PSA 06/20/2022    Benign prostatic hyperplasia with nocturia 06/20/2022    Benign positional vertigo 06/20/2022    Prostate cancer ([facility]) 06/20/2022    History of chronic kidney disease 06/20/2022    PAD (peripheral artery disease) ([facility]) 08/16/2023    Nonrheumatic aortic valve insufficiency 08/16/2023     Resolved Ambulatory Problems     Diagnosis Date Noted    BMI 28.0-28.9,adult 11/10/2021    BMI 29.0-29.9,adult 06/20/2022    History of prostate cancer 06/20/2022     Past Medical History:   Diagnosis Date    Arthritis     Carotid stenosis      Dental disorder     Hypertension        PAST SURGICAL HISTORY:   Past Surgical History:   Procedure Laterality Date    ANGIOGRAM  11/20/2009    Performed by DANIEL HASKINS at SURGERY [facility]    FEMORAL ANEURYSM  11/20/2009    Performed by DANIEL HASKINS at SURGERY [facility]        ALLERGIES:   Allergies   Allergen Reactions    Nkda [No Known Drug Allergy]          CURRENT MEDICATIONS:   Current Outpatient Medications:     rosuvastatin (CRESTOR) 40 MG tablet, Take 1 Tablet by mouth every day., Disp: 100 Tablet, Rfl: 3    hydroCHLOROthiazide 25 MG Tab, Take 1 Tablet by mouth every day., Disp: 100 Tablet, Rfl: 3    enalapril (VASOTEC) 20 MG tablet, Take 1 Tablet by mouth every day., Disp: 100 Tablet, Rfl: 3    ASPIRIN LOW DOSE 81 MG EC tablet, TAKE 1 TABLET BY MOUTH EVERY DAY, Disp: 100 Tablet, Rfl: 3    pantoprazole (PROTONIX) 40 MG Tablet Delayed Response, , Disp: , Rfl:     Magnesium Glycinate 100 MG Cap, , Disp: , Rfl:     vitamin D3 (CHOLECALCIFEROL) 1000 Unit (25 mcg) Tab, Take 1,000 Units by mouth every day., Disp: , Rfl:     finasteride (PROSCAR) 5 MG Tab, Take 5 mg by mouth every day., Disp: , Rfl:     Multiple Vitamin (MULTIVITAMIN PO), Take 1 Tab by mouth every day., Disp: , Rfl:     FAMILY HISTORY:   Family History   Problem Relation Age of Onset    Heart Attack Mother 70    Cancer Father         THROAT        SOCIAL HISTORY:   Social History     Socioeconomic History    Marital status:      Spouse name: Not on file    Number of children: Not on file    Years of education: Not on file    Highest education level: Not on file   Occupational History    Not on file   Tobacco Use    Smoking status: Former     Current packs/day: 0.00     Average packs/day: 0.5 packs/day for 2.0 years (1.0 ttl pk-yrs)     Types: Cigarettes     Start date: 10/16/1971     Quit date: 10/16/1973     Years since quittin.4    Smokeless tobacco: Never    Tobacco comments:     Quit    Substance and Sexual Activity    Alcohol use: Not Currently     Alcohol/week: 0.6 oz     Types: 1 Glasses of wine per week     Comment: occ    Drug use: No    Sexual activity: Not on file     Comment: , has 4 children, retired.   Other Topics Concern    Not on file   Social History Narrative    Not on file     Social Determinants of Health     Financial Resource Strain: Not on file   Food  "Insecurity: Not on file   Transportation Needs: Not on file   Physical Activity: Not on file   Stress: Not on file   Social Connections: Not on file   Intimate Partner Violence: Not on file   Housing Stability: Not on file       REVIEW OF SYSTEMS:  Review of Systems   Constitutional:  Negative for chills, diaphoresis, fever and weight loss.   HENT:  Negative for congestion, ear pain, hearing loss, nosebleeds, sore throat and tinnitus.    Eyes:  Negative for blurred vision, double vision, pain and discharge.   Respiratory:  Negative for cough, hemoptysis, sputum production, wheezing and stridor.    Cardiovascular:  Positive for chest pain. Negative for palpitations, orthopnea and leg swelling.   Gastrointestinal:  Negative for abdominal pain, constipation, heartburn, melena, nausea and vomiting.   Genitourinary:  Negative for dysuria, flank pain and hematuria.   Musculoskeletal:  Positive for joint pain. Negative for myalgias and neck pain.   Skin:  Negative for itching and rash.   Neurological:  Negative for dizziness, speech change, focal weakness, seizures, weakness and headaches.   Endo/Heme/Allergies:  Negative for environmental allergies and polydipsia. Does not bruise/bleed easily.   Psychiatric/Behavioral:  Negative for depression, hallucinations, substance abuse and suicidal ideas.          PHYSICAL EXAMINATION:    /64 (BP Location: Left arm, Patient Position: Sitting, BP Cuff Size: Adult)   Pulse 69   Temp 36.9 °C (98.4 °F) (Temporal)   Ht 1.626 m (5' 4\")   Wt 75 kg (165 lb 4.8 oz)   SpO2 94%   BMI 28.37 kg/m²      Physical Exam  Constitutional:       General: He is not in acute distress.  HENT:      Head: Normocephalic and atraumatic.      Nose: Nose normal.   Eyes:      Conjunctiva/sclera: Conjunctivae normal.      Pupils: Pupils are equal, round, and reactive to light.   Neck:      Vascular: No JVD.      Trachea: No tracheal deviation.   Cardiovascular:      Rate and Rhythm: Normal rate and " "regular rhythm.      Heart sounds: No murmur heard.  Pulmonary:      Effort: Pulmonary effort is normal. No respiratory distress.      Breath sounds: Normal breath sounds. No stridor.   Abdominal:      General: Bowel sounds are normal. There is no distension.      Palpations: Abdomen is soft.      Tenderness: There is no abdominal tenderness.   Musculoskeletal:         General: No tenderness. Normal range of motion.      Cervical back: Normal range of motion and neck supple.   Skin:     General: Skin is warm and dry.   Neurological:      Mental Status: He is alert and oriented to person, place, and time.      Coordination: Coordination normal.      Gait: Gait is intact.   Psychiatric:         Mood and Affect: Mood and affect normal.         Cognition and Memory: Memory normal.         LABS REVIEWED:  Lab Results   Component Value Date/Time    SODIUM 141 02/01/2024 06:27 AM    SODIUM 141 02/01/2024 06:27 AM    POTASSIUM 4.3 02/01/2024 06:27 AM    POTASSIUM 4.2 02/01/2024 06:27 AM    CHLORIDE 102 02/01/2024 06:27 AM    CHLORIDE 102 02/01/2024 06:27 AM    CO2 29 02/01/2024 06:27 AM    CO2 29 02/01/2024 06:27 AM    GLUCOSE 115 (H) 02/01/2024 06:27 AM    GLUCOSE 115 (H) 02/01/2024 06:27 AM    BUN 20 02/01/2024 06:27 AM    BUN 20 02/01/2024 06:27 AM    CREATININE 1.29 02/01/2024 06:27 AM    CREATININE 1.26 02/01/2024 06:27 AM      No results found for: \"PROTHROMBTM\", \"INR\"   Lab Results   Component Value Date/Time    WBC 5.6 02/01/2024 06:27 AM    RBC 4.86 02/01/2024 06:27 AM    HEMOGLOBIN 15.4 02/01/2024 06:27 AM    HEMATOCRIT 46.0 02/01/2024 06:27 AM    MCV 94.7 02/01/2024 06:27 AM    MCH 31.7 02/01/2024 06:27 AM    MCHC 33.5 02/01/2024 06:27 AM    MPV 9.8 02/01/2024 06:27 AM    NEUTSPOLYS 46.10 02/01/2024 06:27 AM    LYMPHOCYTES 41.50 (H) 02/01/2024 06:27 AM    MONOCYTES 8.20 02/01/2024 06:27 AM    EOSINOPHILS 3.40 02/01/2024 06:27 AM    BASOPHILS 0.40 02/01/2024 06:27 AM        IMAGING REVIEWED AND " INTERPRETED:    ECHOCARDIOGRAM   8/17/23 [facility]  CONCLUSIONS  Compared to the prior study on 04/16/2021, ascending aorta size has   increased.  The left ventricular ejection fraction is estimated to be 60%.  Tricuspid aortic valve.  Mild-moderate aortic insufficiency.  The ascending aorta is dilated with a diameter of 4.8 cm.       CARDIAC CATHETERIZATION none on file    CT SCAN CHEST   2/17/24 [facility]  IMPRESSION:        1. 5 cm ascending thoracic aortic aneurysm.  2. 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  3. Cholelithiasis.  4. Simple, Bosniak 1, left renal cysts which do not require follow-up.  5. Colonic diverticulosis.      IMPRESSION:  Ascending aortic aneurysm, abdominal aortic aneurysm, aortoiliac occlusive disease, hypertension, hyperlipidemia, history of prostate cancer, peripheral arterial disease, mild aortic valve insufficiency      PLAN:    I discussed pros and cons of medical management serial observation versus surgical therapy with the patient.  I initially recommended surgical intervention, as the patient's indexing of his aneurysm to his body surface area shows that it is quite large.  Ultimately, the patient has decided that he would prefer medical management and repeat imaging in 3 to 6 months to see if it is growing.  Again I reiterated pros and cons of both approaches for him.  I will refer him to our cardiovascular Center to establish care for medical management as well as serial observation.  I will see him back as directed by my colleagues at the cardiovascular center.    The procedure, its risks, benefits, potential complications and alternative treatments were discussed with the patient in detail including the risks should he decide not to undergo my recommended treatment. All of his questions were answered to his satisfaction. The risks include death, stroke, infection: to include a rare bacterial infection related to the use of the  heart/lung machine, latia-operative myocardial infarction, dysrhythmias, diaphragmatic paralysis, chest wall paresthesia, tracheostomy, kidney or other organ failure, possible return to the operating room for bleeding, bleeding requiring transfusion with its attendant risks including AIDS or hepatitis, dehiscence of surgical incisions, respiratory complications including the need for prolonged ventilator support, Protamine or other drug reaction, peripheral neuropathy, loss of limb, and miscount of surgical items. The operative mortality risk is approximately 3-5%. . The scores were discussed with patient.     Thank you for this very challenging consultation and participation in the patient’s care.  I will keep you apprised of all future developments.      Sincerely,       Taiwo Iyer, .

## 2024-03-07 ENCOUNTER — OFFICE VISIT (OUTPATIENT)
Dept: CARDIOTHORACIC SURGERY | Facility: MEDICAL CENTER | Age: 79
End: 2024-03-07
Payer: MEDICARE

## 2024-03-07 VITALS
WEIGHT: 165.3 LBS | OXYGEN SATURATION: 94 % | BODY MASS INDEX: 28.22 KG/M2 | SYSTOLIC BLOOD PRESSURE: 128 MMHG | HEART RATE: 69 BPM | HEIGHT: 64 IN | TEMPERATURE: 98.4 F | DIASTOLIC BLOOD PRESSURE: 64 MMHG

## 2024-03-07 DIAGNOSIS — I71.21 ANEURYSM OF ASCENDING AORTA WITHOUT RUPTURE (HCC): ICD-10-CM

## 2024-03-07 PROCEDURE — 3074F SYST BP LT 130 MM HG: CPT | Performed by: THORACIC SURGERY (CARDIOTHORACIC VASCULAR SURGERY)

## 2024-03-07 PROCEDURE — 99205 OFFICE O/P NEW HI 60 MIN: CPT | Performed by: THORACIC SURGERY (CARDIOTHORACIC VASCULAR SURGERY)

## 2024-03-07 PROCEDURE — 3078F DIAST BP <80 MM HG: CPT | Performed by: THORACIC SURGERY (CARDIOTHORACIC VASCULAR SURGERY)

## 2024-03-07 ASSESSMENT — ENCOUNTER SYMPTOMS
CONSTIPATION: 0
HEARTBURN: 0
HALLUCINATIONS: 0
DOUBLE VISION: 0
FLANK PAIN: 0
CHILLS: 0
NECK PAIN: 0
BRUISES/BLEEDS EASILY: 0
DIAPHORESIS: 0
MYALGIAS: 0
DIZZINESS: 0
EYE DISCHARGE: 0
FEVER: 0
EYE PAIN: 0
SORE THROAT: 0
SPUTUM PRODUCTION: 0
BLURRED VISION: 0
NAUSEA: 0
PALPITATIONS: 0
STRIDOR: 0
POLYDIPSIA: 0
ABDOMINAL PAIN: 0
WEIGHT LOSS: 0
SPEECH CHANGE: 0
ORTHOPNEA: 0
HEMOPTYSIS: 0
WHEEZING: 0
COUGH: 0
VOMITING: 0
DEPRESSION: 0
FOCAL WEAKNESS: 0
WEAKNESS: 0
HEADACHES: 0
SEIZURES: 0

## 2024-03-07 ASSESSMENT — FIBROSIS 4 INDEX: FIB4 SCORE: 2.42

## 2024-03-07 ASSESSMENT — LIFESTYLE VARIABLES: SUBSTANCE_ABUSE: 0

## 2024-03-09 ENCOUNTER — PATIENT MESSAGE (OUTPATIENT)
Dept: CARDIOLOGY | Facility: MEDICAL CENTER | Age: 79
End: 2024-03-09
Payer: MEDICARE

## 2024-03-11 ENCOUNTER — HOSPITAL ENCOUNTER (OUTPATIENT)
Dept: LAB | Facility: MEDICAL CENTER | Age: 79
End: 2024-03-11
Attending: FAMILY MEDICINE
Payer: MEDICARE

## 2024-03-11 ENCOUNTER — PATIENT MESSAGE (OUTPATIENT)
Dept: CARDIOLOGY | Facility: MEDICAL CENTER | Age: 79
End: 2024-03-11
Payer: MEDICARE

## 2024-03-11 LAB
ALBUMIN SERPL BCP-MCNC: 4.6 G/DL (ref 3.2–4.9)
ALBUMIN/GLOB SERPL: 1.6 G/DL
ALP SERPL-CCNC: 57 U/L (ref 30–99)
ALT SERPL-CCNC: 17 U/L (ref 2–50)
ANION GAP SERPL CALC-SCNC: 12 MMOL/L (ref 7–16)
APPEARANCE UR: CLEAR
AST SERPL-CCNC: 25 U/L (ref 12–45)
BASOPHILS # BLD AUTO: 0.5 % (ref 0–1.8)
BASOPHILS # BLD: 0.03 K/UL (ref 0–0.12)
BILIRUB SERPL-MCNC: 0.8 MG/DL (ref 0.1–1.5)
BILIRUB UR QL STRIP.AUTO: NEGATIVE
BUN SERPL-MCNC: 19 MG/DL (ref 8–22)
CALCIUM ALBUM COR SERPL-MCNC: 9.3 MG/DL (ref 8.5–10.5)
CALCIUM SERPL-MCNC: 9.8 MG/DL (ref 8.5–10.5)
CHLORIDE SERPL-SCNC: 101 MMOL/L (ref 96–112)
CHOLEST SERPL-MCNC: 137 MG/DL (ref 100–199)
CO2 SERPL-SCNC: 28 MMOL/L (ref 20–33)
COLOR UR: YELLOW
CREAT SERPL-MCNC: 1.16 MG/DL (ref 0.5–1.4)
EOSINOPHIL # BLD AUTO: 0.18 K/UL (ref 0–0.51)
EOSINOPHIL NFR BLD: 3.1 % (ref 0–6.9)
ERYTHROCYTE [DISTWIDTH] IN BLOOD BY AUTOMATED COUNT: 42.5 FL (ref 35.9–50)
EST. AVERAGE GLUCOSE BLD GHB EST-MCNC: 131 MG/DL
FASTING STATUS PATIENT QL REPORTED: NORMAL
GFR SERPLBLD CREATININE-BSD FMLA CKD-EPI: 64 ML/MIN/1.73 M 2
GLOBULIN SER CALC-MCNC: 2.9 G/DL (ref 1.9–3.5)
GLUCOSE SERPL-MCNC: 113 MG/DL (ref 65–99)
GLUCOSE UR STRIP.AUTO-MCNC: NEGATIVE MG/DL
HBA1C MFR BLD: 6.2 % (ref 4–5.6)
HCT VFR BLD AUTO: 42.7 % (ref 42–52)
HDLC SERPL-MCNC: 52 MG/DL
HGB BLD-MCNC: 14.9 G/DL (ref 14–18)
IMM GRANULOCYTES # BLD AUTO: 0.01 K/UL (ref 0–0.11)
IMM GRANULOCYTES NFR BLD AUTO: 0.2 % (ref 0–0.9)
KETONES UR STRIP.AUTO-MCNC: NEGATIVE MG/DL
LDLC SERPL CALC-MCNC: 68 MG/DL
LEUKOCYTE ESTERASE UR QL STRIP.AUTO: NEGATIVE
LYMPHOCYTES # BLD AUTO: 2.64 K/UL (ref 1–4.8)
LYMPHOCYTES NFR BLD: 45.7 % (ref 22–41)
MCH RBC QN AUTO: 32.6 PG (ref 27–33)
MCHC RBC AUTO-ENTMCNC: 34.9 G/DL (ref 32.3–36.5)
MCV RBC AUTO: 93.4 FL (ref 81.4–97.8)
MICRO URNS: NORMAL
MONOCYTES # BLD AUTO: 0.43 K/UL (ref 0–0.85)
MONOCYTES NFR BLD AUTO: 7.4 % (ref 0–13.4)
NEUTROPHILS # BLD AUTO: 2.49 K/UL (ref 1.82–7.42)
NEUTROPHILS NFR BLD: 43.1 % (ref 44–72)
NITRITE UR QL STRIP.AUTO: NEGATIVE
NRBC # BLD AUTO: 0 K/UL
NRBC BLD-RTO: 0 /100 WBC (ref 0–0.2)
PH UR STRIP.AUTO: 6 [PH] (ref 5–8)
PLATELET # BLD AUTO: 205 K/UL (ref 164–446)
PMV BLD AUTO: 9.9 FL (ref 9–12.9)
POTASSIUM SERPL-SCNC: 4 MMOL/L (ref 3.6–5.5)
PROT SERPL-MCNC: 7.5 G/DL (ref 6–8.2)
PROT UR QL STRIP: NEGATIVE MG/DL
PSA SERPL-MCNC: 0.31 NG/ML (ref 0–4)
RBC # BLD AUTO: 4.57 M/UL (ref 4.7–6.1)
RBC UR QL AUTO: NEGATIVE
SODIUM SERPL-SCNC: 141 MMOL/L (ref 135–145)
SP GR UR STRIP.AUTO: 1.01
TRIGL SERPL-MCNC: 84 MG/DL (ref 0–149)
TSH SERPL DL<=0.005 MIU/L-ACNC: 1.11 UIU/ML (ref 0.38–5.33)
UROBILINOGEN UR STRIP.AUTO-MCNC: 0.2 MG/DL
WBC # BLD AUTO: 5.8 K/UL (ref 4.8–10.8)

## 2024-03-11 PROCEDURE — 80053 COMPREHEN METABOLIC PANEL: CPT

## 2024-03-11 PROCEDURE — 83036 HEMOGLOBIN GLYCOSYLATED A1C: CPT

## 2024-03-11 PROCEDURE — 81003 URINALYSIS AUTO W/O SCOPE: CPT

## 2024-03-11 PROCEDURE — 80061 LIPID PANEL: CPT

## 2024-03-11 PROCEDURE — 36415 COLL VENOUS BLD VENIPUNCTURE: CPT

## 2024-03-11 PROCEDURE — 85025 COMPLETE CBC W/AUTO DIFF WBC: CPT

## 2024-03-11 PROCEDURE — 84443 ASSAY THYROID STIM HORMONE: CPT

## 2024-03-11 PROCEDURE — 84153 ASSAY OF PSA TOTAL: CPT

## 2024-03-11 ASSESSMENT — PATIENT HEALTH QUESTIONNAIRE - PHQ9
1. LITTLE INTEREST OR PLEASURE IN DOING THINGS: NOT AT ALL
2. FEELING DOWN, DEPRESSED, IRRITABLE, OR HOPELESS: NOT AT ALL

## 2024-03-11 ASSESSMENT — ACTIVITIES OF DAILY LIVING (ADL): BATHING_REQUIRES_ASSISTANCE: 0

## 2024-03-11 ASSESSMENT — ENCOUNTER SYMPTOMS: GENERAL WELL-BEING: GOOD

## 2024-03-12 PROBLEM — Z87.448 HISTORY OF CHRONIC KIDNEY DISEASE: Status: RESOLVED | Noted: 2022-06-20 | Resolved: 2024-03-12

## 2024-03-12 ASSESSMENT — PATIENT HEALTH QUESTIONNAIRE - PHQ9: CLINICAL INTERPRETATION OF PHQ2 SCORE: 0

## 2024-03-12 NOTE — ASSESSMENT & PLAN NOTE
Chronic, stable. Currently taking finasteride 5 mg daily with improvement in nocturia. Denies obstructive symptoms, dysuria, and flank pain.

## 2024-03-12 NOTE — ASSESSMENT & PLAN NOTE
Chronic, stable. Most recent hemoglobin A1C was 6.2 in March 2024. No current medication use, diet-controlled. Followed by primary care provider.

## 2024-03-12 NOTE — ASSESSMENT & PLAN NOTE
Chronic, stable. Currently taking pantoprazole 40 mg daily. Reports avoiding dietary triggers. Denies early satiety, unintentional weight loss, belching, and persistent burning pain in chest or upper abdomen.

## 2024-03-12 NOTE — ASSESSMENT & PLAN NOTE
Chronic, stable. Currently taking aspirin 81 mg daily. Denies chest pain, pain with ambulation, and weakness of extremities.

## 2024-03-12 NOTE — ASSESSMENT & PLAN NOTE
Stable. Reports diagnosis in 2022 with radiation therapy. No active medical treatment. Followed by oncology, Dr. Jimenez.

## 2024-03-12 NOTE — ASSESSMENT & PLAN NOTE
Chronic, stable. Reviewed aorta/iliac duplex from 2017. Currently on statin therapy. Followed by cardiology, Dr. Iyer.

## 2024-03-12 NOTE — ASSESSMENT & PLAN NOTE
Chronic, stable. Currently taking rosuvastatin 40 mg daily. Reports that he plays tennis 3-4 times a week during the warmer months, recently started yesterday. Followed by cardiology, Angella PACE.

## 2024-03-12 NOTE — ASSESSMENT & PLAN NOTE
Chronic, stable. Currently taking enalapril 20 mg and hydrochlorothiazide 25 mg daily. In-office blood pressure is 130/50. Denies headache, palpitations, and lower extremity edema. Followed by cardiology, Angella PACE.

## 2024-03-12 NOTE — ASSESSMENT & PLAN NOTE
"Chronic, stable. Reviewed CT-CTA thoracoabdominal from February 2024: \"ascending thoracic aorta is fusiformly aneurysmal, measuring 5.0 cm in diameter.\"  "

## 2024-03-13 ENCOUNTER — TELEPHONE (OUTPATIENT)
Dept: HEALTH INFORMATION MANAGEMENT | Facility: OTHER | Age: 79
End: 2024-03-13
Payer: MEDICARE

## 2024-03-14 ENCOUNTER — APPOINTMENT (OUTPATIENT)
Dept: FAMILY PLANNING/WOMEN'S HEALTH CLINIC | Facility: PHYSICIAN GROUP | Age: 79
End: 2024-03-14
Payer: MEDICARE

## 2024-03-14 VITALS
SYSTOLIC BLOOD PRESSURE: 130 MMHG | DIASTOLIC BLOOD PRESSURE: 50 MMHG | HEIGHT: 64 IN | WEIGHT: 163 LBS | BODY MASS INDEX: 27.83 KG/M2

## 2024-03-14 DIAGNOSIS — I10 PRIMARY HYPERTENSION: ICD-10-CM

## 2024-03-14 DIAGNOSIS — H81.10 BENIGN PAROXYSMAL POSITIONAL VERTIGO, UNSPECIFIED LATERALITY: ICD-10-CM

## 2024-03-14 DIAGNOSIS — H91.93 HEARING PROBLEM OF BOTH EARS: Chronic | ICD-10-CM

## 2024-03-14 DIAGNOSIS — K21.9 GASTROESOPHAGEAL REFLUX DISEASE, UNSPECIFIED WHETHER ESOPHAGITIS PRESENT: Chronic | ICD-10-CM

## 2024-03-14 DIAGNOSIS — E78.5 DYSLIPIDEMIA, GOAL LDL BELOW 70: ICD-10-CM

## 2024-03-14 DIAGNOSIS — I74.09 AORTOILIAC OCCLUSIVE DISEASE (HCC): Chronic | ICD-10-CM

## 2024-03-14 DIAGNOSIS — R35.1 BENIGN PROSTATIC HYPERPLASIA WITH NOCTURIA: ICD-10-CM

## 2024-03-14 DIAGNOSIS — C61 PROSTATE CANCER (HCC): ICD-10-CM

## 2024-03-14 DIAGNOSIS — N40.1 BENIGN PROSTATIC HYPERPLASIA WITH NOCTURIA: ICD-10-CM

## 2024-03-14 DIAGNOSIS — I71.40 ABDOMINAL AORTIC ANEURYSM (AAA) WITHOUT RUPTURE, UNSPECIFIED PART (HCC): ICD-10-CM

## 2024-03-14 DIAGNOSIS — R73.03 PRE-DIABETES: ICD-10-CM

## 2024-03-14 DIAGNOSIS — I77.810 ASCENDING AORTA DILATATION (HCC): ICD-10-CM

## 2024-03-14 PROCEDURE — 3078F DIAST BP <80 MM HG: CPT

## 2024-03-14 PROCEDURE — 3075F SYST BP GE 130 - 139MM HG: CPT

## 2024-03-14 PROCEDURE — G0439 PPPS, SUBSEQ VISIT: HCPCS

## 2024-03-14 SDOH — HEALTH STABILITY: PHYSICAL HEALTH: ON AVERAGE, HOW MANY DAYS PER WEEK DO YOU ENGAGE IN MODERATE TO STRENUOUS EXERCISE (LIKE A BRISK WALK)?: 1 DAY

## 2024-03-14 SDOH — SOCIAL STABILITY: SOCIAL NETWORK: HOW OFTEN DO YOU ATTENT MEETINGS OF THE CLUB OR ORGANIZATION YOU BELONG TO?: NEVER

## 2024-03-14 SDOH — ECONOMIC STABILITY: HOUSING INSECURITY
IN THE LAST 12 MONTHS, WAS THERE A TIME WHEN YOU DID NOT HAVE A STEADY PLACE TO SLEEP OR SLEPT IN A SHELTER (INCLUDING NOW)?: NO

## 2024-03-14 SDOH — HEALTH STABILITY: MENTAL HEALTH: HOW OFTEN DO YOU HAVE A DRINK CONTAINING ALCOHOL?: NEVER

## 2024-03-14 SDOH — SOCIAL STABILITY: SOCIAL NETWORK
IN A TYPICAL WEEK, HOW MANY TIMES DO YOU TALK ON THE PHONE WITH FAMILY, FRIENDS, OR NEIGHBORS?: MORE THAN THREE TIMES A WEEK

## 2024-03-14 SDOH — HEALTH STABILITY: MENTAL HEALTH
STRESS IS WHEN SOMEONE FEELS TENSE, NERVOUS, ANXIOUS, OR CAN'T SLEEP AT NIGHT BECAUSE THEIR MIND IS TROUBLED. HOW STRESSED ARE YOU?: NOT AT ALL

## 2024-03-14 SDOH — ECONOMIC STABILITY: FOOD INSECURITY: WITHIN THE PAST 12 MONTHS, THE FOOD YOU BOUGHT JUST DIDN'T LAST AND YOU DIDN'T HAVE MONEY TO GET MORE.: NEVER TRUE

## 2024-03-14 SDOH — ECONOMIC STABILITY: HOUSING INSECURITY: IN THE LAST 12 MONTHS, HOW MANY PLACES HAVE YOU LIVED?: 1

## 2024-03-14 SDOH — HEALTH STABILITY: MENTAL HEALTH: HOW OFTEN DO YOU HAVE 6 OR MORE DRINKS ON ONE OCCASION?: NEVER

## 2024-03-14 SDOH — ECONOMIC STABILITY: TRANSPORTATION INSECURITY
IN THE PAST 12 MONTHS, HAS LACK OF TRANSPORTATION KEPT YOU FROM MEETINGS, WORK, OR FROM GETTING THINGS NEEDED FOR DAILY LIVING?: NO

## 2024-03-14 SDOH — SOCIAL STABILITY: SOCIAL NETWORK: ARE YOU MARRIED, WIDOWED, DIVORCED, SEPARATED, NEVER MARRIED, OR LIVING WITH A PARTNER?: MARRIED

## 2024-03-14 SDOH — ECONOMIC STABILITY: INCOME INSECURITY: HOW HARD IS IT FOR YOU TO PAY FOR THE VERY BASICS LIKE FOOD, HOUSING, MEDICAL CARE, AND HEATING?: NOT HARD AT ALL

## 2024-03-14 SDOH — ECONOMIC STABILITY: INCOME INSECURITY: IN THE LAST 12 MONTHS, WAS THERE A TIME WHEN YOU WERE NOT ABLE TO PAY THE MORTGAGE OR RENT ON TIME?: NO

## 2024-03-14 SDOH — ECONOMIC STABILITY: FOOD INSECURITY: WITHIN THE PAST 12 MONTHS, YOU WORRIED THAT YOUR FOOD WOULD RUN OUT BEFORE YOU GOT MONEY TO BUY MORE.: NEVER TRUE

## 2024-03-14 SDOH — SOCIAL STABILITY: SOCIAL NETWORK: HOW OFTEN DO YOU GET TOGETHER WITH FRIENDS OR RELATIVES?: MORE THAN THREE TIMES A WEEK

## 2024-03-14 SDOH — ECONOMIC STABILITY: INCOME INSECURITY: IN THE PAST 12 MONTHS, HAS THE ELECTRIC, GAS, OIL, OR WATER COMPANY THREATENED TO SHUT OFF SERVICE IN YOUR HOME?: NO

## 2024-03-14 SDOH — SOCIAL STABILITY: SOCIAL NETWORK
DO YOU BELONG TO ANY CLUBS OR ORGANIZATIONS SUCH AS CHURCH GROUPS UNIONS, FRATERNAL OR ATHLETIC GROUPS, OR SCHOOL GROUPS?: NO

## 2024-03-14 SDOH — ECONOMIC STABILITY: TRANSPORTATION INSECURITY
IN THE PAST 12 MONTHS, HAS THE LACK OF TRANSPORTATION KEPT YOU FROM MEDICAL APPOINTMENTS OR FROM GETTING MEDICATIONS?: NO

## 2024-03-14 SDOH — SOCIAL STABILITY: SOCIAL NETWORK: HOW OFTEN DO YOU ATTEND CHURCH OR RELIGIOUS SERVICES?: NEVER

## 2024-03-14 SDOH — HEALTH STABILITY: PHYSICAL HEALTH: ON AVERAGE, HOW MANY MINUTES DO YOU ENGAGE IN EXERCISE AT THIS LEVEL?: 60 MIN

## 2024-03-14 SDOH — HEALTH STABILITY: MENTAL HEALTH: HOW MANY STANDARD DRINKS CONTAINING ALCOHOL DO YOU HAVE ON A TYPICAL DAY?: PATIENT DOES NOT DRINK

## 2024-03-14 ASSESSMENT — LIFESTYLE VARIABLES
SKIP TO QUESTIONS 9-10: 1
AUDIT-C TOTAL SCORE: 0

## 2024-03-14 ASSESSMENT — FIBROSIS 4 INDEX: FIB4 SCORE: 2.31

## 2024-03-14 NOTE — ASSESSMENT & PLAN NOTE
Chronic, ongoing. Reports intermittent episodes of vertigo, most recently yesterday. States episodes twice a week that improve with rest.

## 2024-03-14 NOTE — PROGRESS NOTES
Comprehensive Health Assessment Program     Jasiel Silver is a 78 y.o. here for his comprehensive health assessment.    Patient Active Problem List    Diagnosis Date Noted    PAD (peripheral artery disease) ([facility]) 2023    Nonrheumatic aortic valve insufficiency 2023    Elevated PSA 2022    Benign prostatic hyperplasia with nocturia 2022    Benign positional vertigo 2022    Prostate cancer (HCC) 2022    Pre-diabetes 11/10/2021    Ascending aorta dilatation ([facility]) 10/16/2013    Aortoiliac occlusive disease ([facility])     Gastroesophageal reflux disease     Hearing problem     Abdominal aortic aneurysm (HCC) 06/10/2011    HTN (hypertension) 06/10/2011    Dyslipidemia, goal LDL below 70 06/10/2011    Carotid Stenosis - Mild < 50% 06/10/2011       Current Outpatient Medications   Medication Sig Dispense Refill    rosuvastatin (CRESTOR) 40 MG tablet Take 1 Tablet by mouth every day. 100 Tablet 3    hydroCHLOROthiazide 25 MG Tab Take 1 Tablet by mouth every day. 100 Tablet 3    enalapril (VASOTEC) 20 MG tablet Take 1 Tablet by mouth every day. 100 Tablet 3    ASPIRIN LOW DOSE 81 MG EC tablet TAKE 1 TABLET BY MOUTH EVERY  Tablet 3    pantoprazole (PROTONIX) 40 MG Tablet Delayed Response       Magnesium Glycinate 100 MG Cap       vitamin D3 (CHOLECALCIFEROL) 1000 Unit (25 mcg) Tab Take 1,000 Units by mouth every day.      finasteride (PROSCAR) 5 MG Tab Take 5 mg by mouth every day.      Multiple Vitamin (MULTIVITAMIN PO) Take 1 Tab by mouth every day.       No current facility-administered medications for this visit.          Current supplements as per medication list.     Allergies:   Nkda [no known drug allergy]  Social History     Tobacco Use    Smoking status: Former     Current packs/day: 0.00     Average packs/day: 0.5 packs/day for 2.0 years (1.0 ttl pk-yrs)     Types: Cigarettes     Start date: 10/16/1971     Quit date: 10/16/1973     Years since quittin.4     Smokeless tobacco: Never    Tobacco comments:     Quit 1975   Vaping Use    Vaping Use: Never used   Substance Use Topics    Alcohol use: Not Currently     Alcohol/week: 0.6 oz     Types: 1 Glasses of wine per week     Comment: occ    Drug use: No     Family History   Problem Relation Age of Onset    Heart Attack Mother 70    Cancer Father         THROAT     Jasiel  has a past medical history of Aortoiliac occlusive disease (HCC) (11/2009), Arthritis, Ascending aorta dilatation (HCC) (10/2013), Carotid stenosis ( ), Carotid Stenosis - Mild < 50%, Chest discomfort (08/2013), Dental disorder, Dyslipidemia, goal LDL below 70 ( ), Gastroesophageal reflux disease, Hearing problem, History of chronic kidney disease, Hypertension, Nonrheumatic aortic valve insufficiency (08/16/2023), and Prostate cancer (HCC) (06/20/2022).    He has no past medical history of Angina, Arrhythmia, ASTHMA, Backpain, Bronchitis, CATARACT, Congestive heart failure (HCC), COPD, Diabetes, Dialysis, Glaucoma, Heart murmur, Indigestion, Infectious disease, Jaundice, Myocardial infarct (HCC), Other specified symptom associated with female genital organs, Pacemaker, Personal history of venous thrombosis and embolism, Pneumonia, Psychiatric problem, Renal disorder, Rheumatic fever, Seizure (HCC), Unspecified disorder of thyroid, Unspecified hemorrhagic conditions, or Unspecified urinary incontinence.   Past Surgical History:   Procedure Laterality Date    ANGIOGRAM  11/20/2009    Performed by DANIEL HASKINS at SURGERY [facility] ORS    FEMORAL ANEURYSM  11/20/2009    Performed by DANIEL HASKINS at [facility] ORS       Screening:  In the last six months have you experienced any leakage of urine? No    Depression Screening  Little interest or pleasure in doing things?  0 - not at all  Feeling down, depressed , or hopeless? 0 - not at all  Patient Health Questionnaire Score: 0     If depressive symptoms identified deferred to follow up visit  unless specifically addressed in assessment and plan.    Interpretation of PHQ-9 Total Score   Score Severity   1-4 No Depression   5-9 Mild Depression   10-14 Moderate Depression   15-19 Moderately Severe Depression   20-27 Severe Depression    Screening for Cognitive Impairment  Do you or any of your friends or family members have any concern about your memory? No  Three Minute Recall (Leader, Season, Table) 3/3    Toney clock face with all 12 numbers and set the hands to show 10 minutes after 11.  Yes    Cognitive concerns identified deferred for follow up unless specifically addressed in assessment and plan.    Fall Risk Assessment  Has the patient had two or more falls in the last year or any fall with injury in the last year?  No    Safety Assessment  Do you always wear your seatbelt?  Yes  Any changes to home needed to function safely? No  Difficulty hearing.  Yes  Patient counseled about all safety risks that were identified.    Functional Assessment ADLs  Are there any barriers preventing you from cooking for yourself or meeting nutritional needs?  No.    Are there any barriers preventing you from driving safely or obtaining transportation?  No.    Are there any barriers preventing you from using a telephone or calling for help?  No    Are there any barriers preventing you from shopping?  No.    Are there any barriers preventing you from taking care of your own finances?  No    Are there any barriers preventing you from managing your medications?  No    Are there any barriers preventing you from showering, bathing or dressing yourself? No    Are there any barriers preventing you from doing housework or laundry? No  Are there any barriers preventing you from using the toilet?No  Are you currently engaging in any exercise or physical activity?  Yes.      Self-Assessment of Health  What is your perception of your health? Good  Do you sleep more than six hours a night? No  In the past 7 days, how much did pain  keep you from doing your normal work? Some  Do you spend quality time with family or friends (virtually or in person)? Yes  Do you usually eat a heart healthy diet that constists of a variety of fruits, vegetables, whole grains and fiber? Yes  Do you eat foods high in fat and/or Fast Food more than three times per week? No    Advance Care Planning  Do you have an Advance Directive, Living Will, Durable Power of , or POLST? No  Provided patient with educational brochure regarding Advance Care Planning.                   Health Maintenance Summary            Overdue - Zoster (Shingles) Vaccines (2 of 2) Overdue since 11/1/2019 09/06/2019  Imm Admin: Zoster Vaccine Recombinant (RZV) (SHINGRIX)              Annual Wellness Visit (Yearly) Next due on 3/14/2025      03/14/2024  Done - Comprehensive Health Assessment    03/14/2024  Level of Service: [place] ANNUAL WELLNESS VISIT-INCLUDES PPPS SUBSEQUE*    06/20/2022  Level of Service: [place] ANNUAL WELLNESS VISIT-INCLUDES PPPS SUBSEQUE*    11/10/2021  Level of Service: [place] ANNUAL WELLNESS VISIT-INCLUDES PPPS SUBSEQUE*              IMM DTaP/Tdap/Td Vaccine (2 - Td or Tdap) Next due on 2/15/2030      02/15/2020  Imm Admin: Tdap Vaccine              Pneumococcal Vaccine: 65+ Years (Series Information) Completed      11/11/2016  Imm Admin: Pneumococcal polysaccharide vaccine (PPSV-23)    11/03/2014  Imm Admin: Pneumococcal Conjugate Vaccine (Prevnar/PCV-13)              Hepatitis C Screening  Tentatively Complete      06/05/2020  Hepatitis C Antibody component of HEP C VIRUS ANTIBODY    01/03/2017  Hepatitis C Antibody component of HEP C VIRUS ANTIBODY              Influenza Vaccine (Series Information) Completed      10/06/2023  Outside Immunization: Fluzone High-Dose Quad    11/20/2022  Imm Admin: Influenza Vaccine Adult HD    10/19/2020  Imm Admin: Influenza Vaccine Adult HD    10/07/2019  Imm Admin: Influenza, Unspecified - HISTORICAL DATA    11/10/2018  Imm Admin:  Influenza Vaccine Adult HD    Only the first 5 history entries have been loaded, but more history exists.              COVID-19 Vaccine (Series Information) Completed      10/12/2023  Imm Admin: Covid-19 Mrna (Spikevax) Moderna 12+ Years    07/27/2022  Imm Admin: PFIZER THAO CAP SARS-COV-2 VACCINATION (12+)    11/15/2021  Imm Admin: PFIZER PURPLE CAP SARS-COV-2 VACCINATION (12+)    02/13/2021  Imm Admin: PFIZER PURPLE CAP SARS-COV-2 VACCINATION (12+)    01/23/2021  Imm Admin: PFIZER PURPLE CAP SARS-COV-2 VACCINATION (12+)    Only the first 5 history entries have been loaded, but more history exists.              Hepatitis A Vaccine (Hep A) (Series Information) Aged Out      No completion history exists for this topic.              Hepatitis B Vaccine (Hep B) (Series Information) Aged Out      No completion history exists for this topic.              HPV Vaccines (Series Information) Aged Out      No completion history exists for this topic.              Polio Vaccine (Inactivated Polio) (Series Information) Aged Out      No completion history exists for this topic.              Meningococcal Immunization (Series Information) Aged Out      No completion history exists for this topic.              Discontinued - Colorectal Cancer Screening  Discontinued        Frequency changed to Never automatically (Topic No Longer Applies)    11/10/2021  Colon Cancer Screening Cologuard Stool (FIT DNA) (Done - due for next one 2024)                    Patient Care Team:  Jose Ferguson M.D. as PCP - General      Financial Resource Strain: Low Risk  (3/14/2024)    Overall Financial Resource Strain (CARDIA)     Difficulty of Paying Living Expenses: Not hard at all      Transportation Needs: No Transportation Needs (3/14/2024)    PRAPARE - Transportation     Lack of Transportation (Medical): No     Lack of Transportation (Non-Medical): No      Food Insecurity: No Food Insecurity (3/14/2024)    Hunger Vital Sign     Worried About  "Running Out of Food in the Last Year: Never true     Ran Out of Food in the Last Year: Never true        Encounter Vitals  Blood Pressure : 130/50  Weight: 73.9 kg (163 lb)  Height: 162.6 cm (5' 4\")  BMI (Calculated): 27.98     Alert, oriented in no acute distress.  Eye contact is good, speech goal directed, affect calm.    Assessment and Plan. The following treatment and monitoring plan is recommended:    Abdominal aortic aneurysm (HCC)  Chronic, stable. Currently taking aspirin 81 mg daily. Denies pain with ambulation and weakness of extremities.    Aortoiliac occlusive disease  Chronic, stable. Reviewed aorta/iliac duplex from 2017. Continues on statin therapy. Followed by cardiology, Dr. Iyer.    Ascending aorta dilatation (HCC)  Chronic, stable. Reviewed CT-CTA thoracoabdominal from February 2024: \"ascending thoracic aorta is fusiformly aneurysmal, measuring 5.0 cm in diameter.\"    Benign positional vertigo  Chronic, ongoing. Reports intermittent episodes of vertigo-- most recent episode was yesterday. States that he has episodes 1-2 times a week that resolve with rest.     Benign prostatic hyperplasia with nocturia  Chronic, stable. Currently taking finasteride 5 mg daily with improvement in nocturia. Denies obstructive symptoms, dysuria, and flank pain.     Dyslipidemia, goal LDL below 70  Chronic, stable. Currently taking rosuvastatin 40 mg daily. Reports that he plays tennis 3-4 times a week during the warmer months. Followed by cardiology, Dr. Iyer.    Gastroesophageal reflux disease  Chronic, stable. Currently taking pantoprazole 40 mg daily. Reports avoiding dietary triggers. Denies early satiety, unintentional weight loss, belching, and persistent burning pain in chest or upper abdomen.    Hearing problem  Stable. Reports improvement with hearing aids.    HTN (hypertension)  Chronic, stable. Currently taking enalapril 20 mg and hydrochlorothiazide 25 mg daily. In-office blood pressure is " 130/50. Denies headache, palpitations, and lower extremity edema. Followed by cardiology, Dr. Iyer.    Pre-diabetes  Chronic, stable. Most recent hemoglobin A1C was 6.2 in March 2024. No current medication use, diet-controlled. Followed by primary care provider.    Prostate cancer (HCC)  Stable. Reports diagnosis in 2022 with radiation therapy. Routine PSA monitoring-- most recent value was 0.31 in March 2024. Followed by oncology, Dr. Jimenez.      Services suggested: No services needed at this time  Health Care Screening: Age-appropriate preventive services recommended by USPTF and ACIP covered by Medicare were discussed today. Services ordered if indicated and agreed upon by the patient.  Referrals offered: Community-based lifestyle interventions to reduce health risks and promote self-management and wellness, fall prevention, nutrition, physical activity, tobacco-use cessation, weight loss, and mental health services as per orders if indicated.    Discussion today about general wellness and lifestyle habits:    Prevent falls and reduce trip hazards; Cautioned about securing or removing rugs.  Have a working fire alarm and carbon monoxide detector.  Engage in regular physical activity and social activities.    Follow-up: Return for appointment with Primary Care Provider as needed.

## 2024-03-18 ENCOUNTER — HOSPITAL ENCOUNTER (OUTPATIENT)
Dept: RADIOLOGY | Facility: MEDICAL CENTER | Age: 79
End: 2024-03-18
Attending: THORACIC SURGERY (CARDIOTHORACIC VASCULAR SURGERY)
Payer: MEDICARE

## 2024-03-18 DIAGNOSIS — I71.21 ANEURYSM OF ASCENDING AORTA WITHOUT RUPTURE (HCC): ICD-10-CM

## 2024-03-18 PROCEDURE — 93880 EXTRACRANIAL BILAT STUDY: CPT

## 2024-03-18 PROCEDURE — 93880 EXTRACRANIAL BILAT STUDY: CPT | Mod: 26 | Performed by: INTERNAL MEDICINE

## 2024-03-20 ENCOUNTER — OFFICE VISIT (OUTPATIENT)
Dept: CARDIOLOGY | Facility: MEDICAL CENTER | Age: 79
End: 2024-03-20
Payer: MEDICARE

## 2024-03-20 VITALS
HEIGHT: 64 IN | OXYGEN SATURATION: 96 % | HEART RATE: 81 BPM | WEIGHT: 165.1 LBS | BODY MASS INDEX: 28.19 KG/M2 | RESPIRATION RATE: 16 BRPM | SYSTOLIC BLOOD PRESSURE: 112 MMHG | DIASTOLIC BLOOD PRESSURE: 54 MMHG

## 2024-03-20 DIAGNOSIS — I35.1 NONRHEUMATIC AORTIC VALVE INSUFFICIENCY: ICD-10-CM

## 2024-03-20 DIAGNOSIS — I77.810 ASCENDING AORTA DILATATION (HCC): ICD-10-CM

## 2024-03-20 DIAGNOSIS — I74.09 AORTOILIAC OCCLUSIVE DISEASE (HCC): Chronic | ICD-10-CM

## 2024-03-20 DIAGNOSIS — I10 PRIMARY HYPERTENSION: ICD-10-CM

## 2024-03-20 DIAGNOSIS — E78.5 DYSLIPIDEMIA, GOAL LDL BELOW 70: ICD-10-CM

## 2024-03-20 PROCEDURE — 3078F DIAST BP <80 MM HG: CPT

## 2024-03-20 PROCEDURE — 3074F SYST BP LT 130 MM HG: CPT

## 2024-03-20 PROCEDURE — 99212 OFFICE O/P EST SF 10 MIN: CPT

## 2024-03-20 PROCEDURE — 99213 OFFICE O/P EST LOW 20 MIN: CPT

## 2024-03-20 PROCEDURE — 99214 OFFICE O/P EST MOD 30 MIN: CPT

## 2024-03-20 RX ORDER — CARVEDILOL 6.25 MG/1
6.25 TABLET ORAL 2 TIMES DAILY WITH MEALS
Qty: 60 TABLET | Refills: 11 | Status: SHIPPED | OUTPATIENT
Start: 2024-03-20

## 2024-03-20 RX ORDER — AMLODIPINE BESYLATE 5 MG/1
2.5 TABLET ORAL DAILY
Qty: 15 TABLET | Refills: 11 | Status: SHIPPED | OUTPATIENT
Start: 2024-03-20 | End: 2024-03-20

## 2024-03-20 ASSESSMENT — ENCOUNTER SYMPTOMS
NERVOUS/ANXIOUS: 0
NEUROLOGICAL NEGATIVE: 1
PND: 0
DEPRESSION: 0
ORTHOPNEA: 0
GASTROINTESTINAL NEGATIVE: 1
PALPITATIONS: 0
MUSCULOSKELETAL NEGATIVE: 1
EYES NEGATIVE: 1
CONSTITUTIONAL NEGATIVE: 1
SHORTNESS OF BREATH: 0

## 2024-03-20 ASSESSMENT — FIBROSIS 4 INDEX: FIB4 SCORE: 2.31

## 2024-03-20 NOTE — PROGRESS NOTES
Chief Complaint   Patient presents with    Other     F/V Dx: Ascending aorta dilatation       Eusebia Silver is a 78 y.o. male who presents today  for follow up. They have a history of aortic aneurysm, dyslipidemia, hypertension, aortic valve insufficiency, PAD     Seen by Dr. Alicea on 8/16/2023, at that visit he was having anginal symptoms, a cardiac stress test and echocardiogram were ordered, he was also started on aspirin and had his rosuvastatin dose increased for goal LDL of less than 70      Seen by myself on 11/14/2023 he has been having left breast pain.  He points out that his left breast is larger than his right. pain in his left breast that is not associated with activity.  He has infrequent palpitations, denies shortness of breath, does have some mild leg swelling. He plays tennis 2x per week for 2-3 hours.  I ordered a CT aorta and lipid panel.      At visit with myself on 2/22/2024 he has been feeling really well overall.  Denies chest pain, shortness of breath, or other cardiac symptoms. I referred him to CTS for his ascending aortic aneurysm    He was seen by Dr. Iyer on 3/7/2024 at that visit he was recommended to undergo surgical intervention.  However patient elected medical management and repeat imaging in 3 to 6 months.     Today 3/20/2024 he has been feeling overall well, no significant cardiac symptoms.  He is planning to go to the [facility] at some point over the summer.    Past Medical History:   Diagnosis Date    Aortoiliac occlusive disease (HCC) 11/2009    Status post left iliac aneurysm repair    Arthritis          Ascending aorta dilatation (HCC) 10/2013    CT angiogram with ascending aorta at 4.4cm.    Carotid stenosis      Less than 50% stenosis    Carotid Stenosis - Mild < 50%     Chest discomfort 08/2013    MPI with no evidence of ischemia or infarct, normal LVEF    Dental disorder     Dentures/broken teeth    Dyslipidemia, goal LDL below 70       Gastroesophageal reflux disease     Hearing problem     History of chronic kidney disease     Hypertension     Nonrheumatic aortic valve insufficiency 2023    Prostate cancer (HCC) 2022     Past Surgical History:   Procedure Laterality Date    ANGIOGRAM  2009    Performed by DANIEL HASKINS at SURGERY [facility] ORS    FEMORAL ANEURYSM  2009    Performed by DANIEL HASKINS at SURGERY [facility] ORS     Family History   Problem Relation Age of Onset    Heart Attack Mother 70    Cancer Father         THROAT     Social History     Socioeconomic History    Marital status:      Spouse name: Not on file    Number of children: Not on file    Years of education: Not on file    Highest education level: Not on file   Occupational History    Not on file   Tobacco Use    Smoking status: Former     Current packs/day: 0.00     Average packs/day: 0.5 packs/day for 2.0 years (1.0 ttl pk-yrs)     Types: Cigarettes     Start date: 10/16/1971     Quit date: 10/16/1973     Years since quittin.4    Smokeless tobacco: Never    Tobacco comments:     Quit    Vaping Use    Vaping Use: Never used   Substance and Sexual Activity    Alcohol use: Not Currently     Alcohol/week: 0.6 oz     Types: 1 Glasses of wine per week     Comment: occ    Drug use: No    Sexual activity: Not on file     Comment: , has 4 children, retired.   Other Topics Concern    Not on file   Social History Narrative    Not on file     Social Determinants of Health     Financial Resource Strain: Low Risk  (3/14/2024)    Overall Financial Resource Strain (CARDIA)     Difficulty of Paying Living Expenses: Not hard at all   Food Insecurity: No Food Insecurity (3/14/2024)    Hunger Vital Sign     Worried About Running Out of Food in the Last Year: Never true     Ran Out of Food in the Last Year: Never true   Transportation Needs: No Transportation Needs (3/14/2024)    PRAPARE - Transportation     Lack of Transportation  (Medical): No     Lack of Transportation (Non-Medical): No   Physical Activity: Insufficiently Active (3/14/2024)    Exercise Vital Sign     Days of Exercise per Week: 1 day     Minutes of Exercise per Session: 60 min   Stress: No Stress Concern Present (3/14/2024)    [employer] [place] [employer] - Occupational Stress Questionnaire     Feeling of Stress : Not at all   Social Connections: Moderately Isolated (3/14/2024)    Social Connection and Isolation Panel [NHANES]     Frequency of Communication with Friends and Family: More than three times a week     Frequency of Social Gatherings with Friends and Family: More than three times a week     Attends [employer] Services: Never     Active Member of Clubs or Organizations: No     Attends Club or Organization Meetings: Never     Marital Status:    Intimate Partner Violence: Not on file   Housing Stability: Low Risk  (3/14/2024)    Housing Stability Vital Sign     Unable to Pay for Housing in the Last Year: No     Number of Places Lived in the Last Year: 1     Unstable Housing in the Last Year: No     Allergies   Allergen Reactions    Nkda [No Known Drug Allergy]      Outpatient Encounter Medications as of 3/20/2024   Medication Sig Dispense Refill    rosuvastatin (CRESTOR) 40 MG tablet Take 1 Tablet by mouth every day. (Patient taking differently: Take 20 mg by mouth every day.) 100 Tablet 3    hydroCHLOROthiazide 25 MG Tab Take 1 Tablet by mouth every day. 100 Tablet 3    enalapril (VASOTEC) 20 MG tablet Take 1 Tablet by mouth every day. (Patient taking differently: Take 10 mg by mouth every day. Twice a day) 100 Tablet 3    ASPIRIN LOW DOSE 81 MG EC tablet TAKE 1 TABLET BY MOUTH EVERY  Tablet 3    pantoprazole (PROTONIX) 40 MG Tablet Delayed Response 20 mg every day.      Magnesium Glycinate 100 MG Cap       vitamin D3 (CHOLECALCIFEROL) 1000 Unit (25 mcg) Tab Take 1,000 Units by mouth every day.      finasteride (PROSCAR) 5 MG Tab Take 5 mg  "by mouth every day.      Multiple Vitamin (MULTIVITAMIN PO) Take 1 Tab by mouth every day.       No facility-administered encounter medications on file as of 3/20/2024.     Review of Systems   Constitutional: Negative.    HENT: Negative.     Eyes: Negative.    Respiratory:  Negative for shortness of breath.    Cardiovascular:  Negative for chest pain, palpitations, orthopnea, leg swelling and PND.   Gastrointestinal: Negative.    Genitourinary: Negative.    Musculoskeletal: Negative.    Skin: Negative.    Neurological: Negative.    Endo/Heme/Allergies: Negative.    Psychiatric/Behavioral:  Negative for depression. The patient is not nervous/anxious.               Objective     /54 (BP Location: Left arm, Patient Position: Sitting, BP Cuff Size: Adult)   Pulse 81   Resp 16   Ht 1.626 m (5' 4\")   Wt 74.9 kg (165 lb 1.6 oz)   SpO2 96%   BMI 28.34 kg/m²     Physical Exam  Constitutional:       Appearance: Normal appearance.   HENT:      Head: Normocephalic.   Neck:      Vascular: No JVD.   Cardiovascular:      Rate and Rhythm: Normal rate and regular rhythm.      Pulses: Normal pulses.      Heart sounds: Murmur heard.      Diastolic murmur is present with a grade of 2/4.      No friction rub.   Pulmonary:      Effort: Pulmonary effort is normal.      Breath sounds: Normal breath sounds.   Abdominal:      Palpations: Abdomen is soft.   Musculoskeletal:         General: Normal range of motion.      Right lower leg: No edema.      Left lower leg: No edema.   Skin:     General: Skin is warm and dry.   Neurological:      General: No focal deficit present.      Mental Status: He is alert and oriented to person, place, and time.   Psychiatric:         Mood and Affect: Mood normal.         Behavior: Behavior normal.            Lab Results   Component Value Date/Time    CHOLSTRLTOT 137 03/11/2024 06:18 AM    LDL 68 03/11/2024 06:18 AM    HDL 52 03/11/2024 06:18 AM    TRIGLYCERIDE 84 03/11/2024 06:18 AM       Lab " Results   Component Value Date/Time    SODIUM 141 03/11/2024 06:18 AM    POTASSIUM 4.0 03/11/2024 06:18 AM    CHLORIDE 101 03/11/2024 06:18 AM    CO2 28 03/11/2024 06:18 AM    GLUCOSE 113 (H) 03/11/2024 06:18 AM    BUN 19 03/11/2024 06:18 AM    CREATININE 1.16 03/11/2024 06:18 AM     Lab Results   Component Value Date/Time    ALKPHOSPHAT 57 03/11/2024 06:18 AM    ASTSGOT 25 03/11/2024 06:18 AM    ALTSGPT 17 03/11/2024 06:18 AM    TBILIRUBIN 0.8 03/11/2024 06:18 AM      CT aorta 2/16/2024     1. 5 cm ascending thoracic aortic aneurysm.  2. 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  3. Cholelithiasis.  4. Simple, Bosniak 1, left renal cysts which do not require follow-up.  5. Colonic diverticulosis.    Assessment & Plan     1. Ascending aorta dilatation (HCC)        2. Nonrheumatic aortic valve insufficiency        3. Primary hypertension        4. Dyslipidemia, goal LDL below 70        5. Aortoiliac occlusive disease (HCC)            Medical Decision Making: Today's Assessment/Status/Plan:          Aneurysm of ascending aorta with aortic insufficiency  -CT showed 5 cm aneurysm  -Echo on 8/17/2023 showed an ascending aorta of 4.8 cm  -CTS saw him and recommended surgery, however he is wanting to get a repeat CT scan this summer  -He has not yet scheduled his CT scan, I have recommended that he go ahead and move forward with this  -We discussed avoiding lifting heavy weights and avoiding intense physical activity     Hypertension  -Good control overall, occasional higher blood pressures at home, add carvedilol for improved BP and HR control  - goal < 130/80  - check in in 2 weeks with home blood pressure readings      Hyperlipidemia with aortoiliac occlusive disease and PAD  - LDL 68  -continue rosuvastatin 40 mg daily  -LDL goal less than 70  -check annually      Additionally we discussed patient's incidental findings on his CT scan including pancreatic cyst,  recommend following up with Dr. Ferguson     Follow-up with KATELYNN Murrell in 3 months with CT scan     This note was dictated using Dragon speech recognition software.

## 2024-04-03 ENCOUNTER — PATIENT MESSAGE (OUTPATIENT)
Dept: CARDIOLOGY | Facility: MEDICAL CENTER | Age: 79
End: 2024-04-03
Payer: MEDICARE

## 2024-04-11 ENCOUNTER — HOSPITAL ENCOUNTER (OUTPATIENT)
Dept: LAB | Facility: MEDICAL CENTER | Age: 79
End: 2024-04-11
Payer: MEDICARE

## 2024-04-11 DIAGNOSIS — E78.5 DYSLIPIDEMIA: ICD-10-CM

## 2024-04-11 LAB
CHOLEST SERPL-MCNC: 143 MG/DL (ref 100–199)
HDLC SERPL-MCNC: 39 MG/DL
LDLC SERPL CALC-MCNC: 83 MG/DL
TRIGL SERPL-MCNC: 105 MG/DL (ref 0–149)

## 2024-04-11 PROCEDURE — 80061 LIPID PANEL: CPT

## 2024-04-11 PROCEDURE — 36415 COLL VENOUS BLD VENIPUNCTURE: CPT

## 2024-04-12 ENCOUNTER — TELEPHONE (OUTPATIENT)
Dept: VASCULAR LAB | Facility: MEDICAL CENTER | Age: 79
End: 2024-04-12
Payer: MEDICARE

## 2024-04-16 ENCOUNTER — OFFICE VISIT (OUTPATIENT)
Dept: VASCULAR LAB | Facility: MEDICAL CENTER | Age: 79
End: 2024-04-16
Attending: FAMILY MEDICINE
Payer: MEDICARE

## 2024-04-16 ENCOUNTER — TELEPHONE (OUTPATIENT)
Dept: PHARMACY | Facility: MEDICAL CENTER | Age: 79
End: 2024-04-16

## 2024-04-16 VITALS
HEIGHT: 64 IN | WEIGHT: 166 LBS | BODY MASS INDEX: 28.34 KG/M2 | HEART RATE: 66 BPM | DIASTOLIC BLOOD PRESSURE: 78 MMHG | SYSTOLIC BLOOD PRESSURE: 124 MMHG

## 2024-04-16 DIAGNOSIS — R73.03 PRE-DIABETES: ICD-10-CM

## 2024-04-16 DIAGNOSIS — I74.09 AORTOILIAC OCCLUSIVE DISEASE (HCC): Chronic | ICD-10-CM

## 2024-04-16 DIAGNOSIS — I10 PRIMARY HYPERTENSION: ICD-10-CM

## 2024-04-16 DIAGNOSIS — I73.9 PAD (PERIPHERAL ARTERY DISEASE) (HCC): ICD-10-CM

## 2024-04-16 DIAGNOSIS — E78.5 DYSLIPIDEMIA, GOAL LDL BELOW 70: ICD-10-CM

## 2024-04-16 DIAGNOSIS — Z95.828 S/P INSERTION OF ILIAC ARTERY STENT: Chronic | ICD-10-CM

## 2024-04-16 DIAGNOSIS — I71.21 ANEURYSM OF ASCENDING AORTA WITHOUT RUPTURE (HCC): Chronic | ICD-10-CM

## 2024-04-16 DIAGNOSIS — C61 PROSTATE CANCER (HCC): ICD-10-CM

## 2024-04-16 DIAGNOSIS — I72.3 ANEURYSM OF LEFT ILIAC ARTERY (HCC): Chronic | ICD-10-CM

## 2024-04-16 PROCEDURE — 3078F DIAST BP <80 MM HG: CPT | Performed by: FAMILY MEDICINE

## 2024-04-16 PROCEDURE — 3074F SYST BP LT 130 MM HG: CPT | Performed by: FAMILY MEDICINE

## 2024-04-16 PROCEDURE — 99204 OFFICE O/P NEW MOD 45 MIN: CPT | Performed by: FAMILY MEDICINE

## 2024-04-16 PROCEDURE — 99212 OFFICE O/P EST SF 10 MIN: CPT

## 2024-04-16 RX ORDER — OLMESARTAN MEDOXOMIL 20 MG/1
20 TABLET ORAL DAILY
Qty: 100 TABLET | Refills: 3 | Status: SHIPPED | OUTPATIENT
Start: 2024-04-16

## 2024-04-16 RX ORDER — EZETIMIBE 10 MG/1
10 TABLET ORAL DAILY
Qty: 100 TABLET | Refills: 3 | Status: SHIPPED | OUTPATIENT
Start: 2024-04-16

## 2024-04-16 RX ORDER — ROSUVASTATIN CALCIUM 20 MG/1
20 TABLET, COATED ORAL DAILY
Qty: 100 TABLET | Refills: 3 | Status: SHIPPED | OUTPATIENT
Start: 2024-04-16

## 2024-04-16 ASSESSMENT — ENCOUNTER SYMPTOMS
PND: 0
CHILLS: 0
COUGH: 0
PALPITATIONS: 0
HEMOPTYSIS: 0
CLAUDICATION: 0
FEVER: 0
WHEEZING: 0
ORTHOPNEA: 0
SPUTUM PRODUCTION: 0
SHORTNESS OF BREATH: 0

## 2024-04-16 ASSESSMENT — FIBROSIS 4 INDEX: FIB4 SCORE: 2.31

## 2024-04-16 NOTE — TELEPHONE ENCOUNTER
Back to back call Attempts to contact patient at [phone] to discuss [facility] and services/benefits offered. No answer, left voicemail.     Adriana Marie  Rx Coordinator   [phone]

## 2024-04-16 NOTE — PROGRESS NOTES
VASCULAR MEDICINE CLINIC - INITIAL VISIT  24     Jasiel Silver is a 78 y.o. male  who has been referred for vascular medicine evaluation and management ascending aortic aneurysm (AsAA), est 2024   Referring provider: Taiwo Iyer (CT surg)     Subjective      AsAA   Initial visit hx/sx: seen by cardiology and CT surg (3/7/24).  Reported hx of acute CP and had incidental AsAA noted .  Denies chest, back, abdominal pain, SOB, dysphagia, worsening cough, hemoptysis.  Pertinent pmhx:  4.8 cm, initial noted size on echo 2023   HTN: Yes, Details: on medications,  dx in   Hx of tobacco:   reports that he quit smoking about 50 years ago. His smoking use included cigarettes. He started smoking about 52 years ago. He has a 1 pack-year smoking history. He has never used smokeless tobacco.  Hx of connective tissue d/o (eg. Marfans, Kasi-Danlos, Loeys-Constantin): no  Hx of inflammatory / autoimmune vasculitis (Takayasu's arteritis, Giant Cell arteritis): no   Hx of infective aortitis, HIV, syphilis: no   Hx of MVA, chest wall trauma, deceleration injuries: no   Hx of Biscuspid Aortic Valve:  no, per echo   Hx of anabolic steroid use or legal or illicit stimulants: no  Pertinent famhx:  Connective tissue disorders: no   Aneurysmal disease or known hereditary thoracic aneurysmal disease: no    PAD, s/p L iliac art stenting (, Dr. Viramontes)  L internal iliac art coiling for aneurysm:  Feeling well.  No claudication.  Last RUSTY .  No other new sx    HTN: Stable, tolerating meds with good adherence, Home BP los/70s   HLD: Stable, current treatment: High intensity statin - tolerating, good adherence  Antithrombotic tx: Yes, Details: ASA 81mg , no bleeding noted      Patient Active Problem List    Diagnosis Date Noted    S/P insertion of iliac artery stent 2024    Aneurysm of left iliac artery (HCC) 2024    PAD (peripheral artery disease) (HCC) 2023    Nonrheumatic aortic valve  insufficiency 2023    Elevated PSA 2022    Benign prostatic hyperplasia with nocturia 2022    Benign positional vertigo 2022    Prostate cancer (HCC) 2022    Pre-diabetes 11/10/2021    Ascending aorta dilatation (HCC) 10/16/2013    Aortoiliac occlusive disease (HCC)     Gastroesophageal reflux disease     Hearing problem     HTN (hypertension) 06/10/2011    Dyslipidemia, goal LDL below 70 06/10/2011    Carotid Stenosis - Mild < 50% 06/10/2011      Past Surgical History:   Procedure Laterality Date    ANGIOGRAM  2009    Performed by DANIEL HASKINS at SURGERY [facility] ORS    FEMORAL ANEURYSM  2009    Performed by DANIEL HASKINS at SURGERY [facility] ORS      Family History   Problem Relation Age of Onset    Heart Attack Mother 70    Cancer Father         THROAT      Current Outpatient Medications on File Prior to Visit   Medication Sig Dispense Refill    carvedilol (COREG) 6.25 MG Tab Take 1 Tablet by mouth 2 times a day with meals. 60 Tablet 11    hydroCHLOROthiazide 25 MG Tab Take 1 Tablet by mouth every day. 100 Tablet 3    ASPIRIN LOW DOSE 81 MG EC tablet TAKE 1 TABLET BY MOUTH EVERY  Tablet 3    pantoprazole (PROTONIX) 40 MG Tablet Delayed Response 20 mg every day.      Magnesium Glycinate 100 MG Cap       vitamin D3 (CHOLECALCIFEROL) 1000 Unit (25 mcg) Tab Take 1,000 Units by mouth every day.      finasteride (PROSCAR) 5 MG Tab Take 5 mg by mouth every day.      Multiple Vitamin (MULTIVITAMIN PO) Take 1 Tab by mouth every day.       No current facility-administered medications on file prior to visit.      ALLERGIES  Nkda [no known drug allergy]    Social History     Tobacco Use    Smoking status: Former     Current packs/day: 0.00     Average packs/day: 0.5 packs/day for 2.0 years (1.0 ttl pk-yrs)     Types: Cigarettes     Start date: 10/16/1971     Quit date: 10/16/1973     Years since quittin.5    Smokeless tobacco: Never    Tobacco comments:      "Quit 1975   Vaping Use    Vaping Use: Never used   Substance Use Topics    Alcohol use: Not Currently     Alcohol/week: 0.6 oz     Types: 1 Glasses of wine per week     Comment: occ    Drug use: No     DIET AND EXERCISE:  Weight Change:stable  Diet: common adult  Exercise: moderate regular exercise program     Review of Systems   Constitutional:  Negative for chills, fever and malaise/fatigue.   Respiratory:  Negative for cough, hemoptysis, sputum production, shortness of breath and wheezing.    Cardiovascular:  Negative for chest pain, palpitations, orthopnea, claudication, leg swelling and PND.          Objective    Vitals:    04/16/24 0917 04/16/24 0920   BP: 137/80 124/78   BP Location: Left arm Left arm   Patient Position: Sitting Sitting   BP Cuff Size: Adult Adult   Pulse: 68 66   Weight: 75.3 kg (166 lb)    Height: 1.626 m (5' 4\")       BP Readings from Last 4 Encounters:   04/16/24 124/78   03/20/24 112/54   03/14/24 130/50   03/07/24 128/64      Body mass index is 28.49 kg/m².   Wt Readings from Last 4 Encounters:   04/16/24 75.3 kg (166 lb)   03/20/24 74.9 kg (165 lb 1.6 oz)   03/14/24 73.9 kg (163 lb)   03/07/24 75 kg (165 lb 4.8 oz)      Physical Exam  Constitutional:       General: He is not in acute distress.     Appearance: Normal appearance. He is not diaphoretic.   HENT:      Head: Normocephalic and atraumatic.   Eyes:      Conjunctiva/sclera: Conjunctivae normal.   Cardiovascular:      Rate and Rhythm: Normal rate and regular rhythm.      Pulses:           Carotid pulses are 2+ on the right side and 2+ on the left side.       Radial pulses are 2+ on the right side and 2+ on the left side.        Dorsalis pedis pulses are 2+ on the right side and 2+ on the left side.        Posterior tibial pulses are 2+ on the right side and 2+ on the left side.      Heart sounds: Normal heart sounds.   Pulmonary:      Effort: Pulmonary effort is normal.      Breath sounds: Normal breath sounds.   Musculoskeletal: "      Right lower leg: No edema.      Left lower leg: No edema.   Skin:     General: Skin is warm and dry.   Neurological:      Mental Status: He is alert and oriented to person, place, and time.      Cranial Nerves: No cranial nerve deficit.      Gait: Gait is intact.   Psychiatric:         Mood and Affect: Mood and affect normal.          DATA REVIEW    Lab Results   Component Value Date/Time    CHOLSTRLTOT 143 04/11/2024 06:09 AM    LDL 83 04/11/2024 06:09 AM    HDL 39 (A) 04/11/2024 06:09 AM    TRIGLYCERIDE 105 04/11/2024 06:09 AM       Lab Results   Component Value Date/Time    SODIUM 141 03/11/2024 06:18 AM    POTASSIUM 4.0 03/11/2024 06:18 AM    CHLORIDE 101 03/11/2024 06:18 AM    CO2 28 03/11/2024 06:18 AM    GLUCOSE 113 (H) 03/11/2024 06:18 AM    BUN 19 03/11/2024 06:18 AM    CREATININE 1.16 03/11/2024 06:18 AM     Lab Results   Component Value Date/Time    ALKPHOSPHAT 57 03/11/2024 06:18 AM    ASTSGOT 25 03/11/2024 06:18 AM    ALTSGPT 17 03/11/2024 06:18 AM    TBILIRUBIN 0.8 03/11/2024 06:18 AM       Lab Results   Component Value Date/Time    HBA1C 6.2 (H) 03/11/2024 06:18 AM       Lab Results   Component Value Date/Time    MALBCRT 22 01/23/2019 07:40 AM    MICROALBUR 0.8 01/23/2019 07:40 AM         Cardiovascular Imaging:    OP note 2009 (Dr. Viramontes)  POSTOPERATIVE DIAGNOSIS:  Symptomatic left common iliac artery aneurysm.  OPERATIONS PERFORMED  1.  Left common femoral artery exposure.  2.  Percutaneous cannulation of right common femoral artery under ultrasound guidance.  3.  Catheter, aorta.  4.  Selective catheterization of left hypogastric artery from contralateral approach.  5.  Proximal left hypogastric artery embolization using the 10-mm Amplatzer plug.  6.  Endovascular repair of symptomatic left [id number] common iliac artery aneurysm using 13 x 85 mm and 13 x 55 mm iliac limbs.    CTA thoracic Ao 2013  1. Aneurysmal dilatation of the ascending thoracic aorta to 4.4 cm.   2. Status post  stenting of left common iliac and external iliac arteries.   3. Diverticulosis without evidence of diverticulitis.   4. Left renal cyst.     Ao/iliac duplex 2018 (no final report)   No evidence of abdominal aortic or iliac artery aneurysm. Common iliac    diameter:    Right-  1.3 cm            Left-   1.3 cm   Triphasic flow throughout the abdominal aorta and bilateral iliac arteries   with    no evidence of significant stenosis.   Patent stent noted in the left common iliac artery.   No stenosis of the proximal segment of the major visceral arteries.    RUSTY 2018  Normal bilateral lower extremity arterial physiologic study at rest.     Echo 2019  Compared to the images of the prior study done 12/28/2017, Aortic valve   stenosis slightly progressed.  Aortic root and the ascending aorta dilatation.Measures 4.4 cm.  Left ventricular ejection fraction is visually estimated to be 60%.  Normal diastolic function.  Mild to moderate aortic stenosis. Mild aortic insufficiency.    Echo 8/2023  Compared to the prior study on 04/16/2021, ascending aorta size has increased.  The left ventricular ejection fraction is estimated to be 60%.  Tricuspid aortic valve.  Mild-moderate aortic insufficiency.  The ascending aorta is dilated with a diameter of 4.8 cm.     MPI 8/2023   NUCLEAR IMAGING INTERPRETATION   No evidence of significant jeopardized viable myocardium or prior myocardial    infarction.   Normal left ventricular size, ejection fraction, and wall motion.   ECG INTERPRETATION   Nondiagnostic due to resting ECG.    CTA chest 2/17/24   1. 5 cm ascending thoracic aortic aneurysm.  2. 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  3. Cholelithiasis.  4. Simple, Bosniak 1, left renal cysts which do not require follow-up.  5. Colonic diverticulosis.    Carotid 3/2024  Normal bilateral carotid exam.     CTA chest 9/7/24 - pending/ordered            Medical Decision Making:   Today's Assessment / Status / Plan:     1. Aneurysm of ascending aorta without rupture (HCC)  olmesartan (BENICAR) 20 MG Tab    CRP HIGH SENSITIVE (CARDIAC)      2. PAD (peripheral artery disease) (HCC)        3. Primary hypertension  olmesartan (BENICAR) 20 MG Tab    MICROALBUMIN CREAT RATIO URINE      4. Dyslipidemia, goal LDL below 70  rosuvastatin (CRESTOR) 20 MG Tab    ezetimibe (ZETIA) 10 MG Tab    CRP HIGH SENSITIVE (CARDIAC)    Lipid Profile    Lipoprotein (a)      5. Prostate cancer (HCC)        6. Aortoiliac occlusive disease (HCC)  MICROALBUMIN CREAT RATIO URINE    Lipid Profile    Comp Metabolic Panel    CRP HIGH SENSITIVE (CARDIAC)    Lipid Profile    Lipoprotein (a)      7. Pre-diabetes  HEMOGLOBIN A1C (Glycohemoglobin GHB Total/A1C with MBG Estimate)      8. Aneurysm of left iliac artery (HCC)      s/p coiling, internal iliac a      9. S/P insertion of iliac artery stent      LEFT         Etiology of Established CVD if Present:     1) Ascending aortic aneurysm - stable, asymptomatic.  Generally not atherosclerotic in nature   2013 CT = 4.4cm - initial sizing   8/2023 echo = 4.8cm - mild growth over 10yrs   2/2024 CTA = 5.0cm - mild growth over 1yr, not deemed surg candidate at this time   - as reviewed with pt, surg repair indicated if 5.5+cm or rapid expansion rate (defined as >0.5 cm in 1 year or >0.3 cm/y in 2 consecutive years for those with sporadic aneurysms, and >0.3 cm in 1 year for those with hereditary thoracic Ao disease (HTAD) or bicuspid AV) due to increase risks for dissection/rupture  - no known HTAD, so presumed sporadic development from cystic medial degeneration  - No reported fhx or pmhx of connective tissue disease  - Echo shows no biscupid Ao valve  - at initial visit - reviewed anatomy, risks, emergency s/s requiring immediate attention, and thresholds for surgical intervention and gave handout   Plan:   - continue med mgmt - focus BB and ARB as 1st line BP agents   - recommend  1st degree relatives get screened with echo for TAA as recommended by 2022 ACC guideline  - short term interval surveillance Q6mo as >5cm,  repeat CTA chest 9/2024, then Q6mo  - avoid all tobacco products and prescription or illicit stimulants   - activity restrictions include no long duration high intensity cardio, extreme endurance activities, high-intensity strength training >20-30lbs, and avoid straining or holding breath when lifting    2) Left common iliac aneurysm, s/p stenting 2009 - stable, no claudication  2009 - s/p L iliac   2018 duplex = stable repair   - No indications of true chronic limb threatening ischemia (CLTI) changes or hx of lifestyle-limiting symptoms   Millie class:  I  Change in Pain-free walking distance: n/a  Change in Maximum walking distance: n/a  - as reviewed with patient, evidenced-based standard of care includes risk factor reduction, med mgmt, and exercise therapy with limited need for surgical intervention in majority of patients if there is CLTI or severe lifestyle limiting symptoms after >6mo of standard therapy   Plan:  - continue TLC measures and continued complete cessation of tobacco products   - continue structured exercise therapy for 12 weeks as outlined in care instructions, monitor pain-free and total walking distance to monitor progress  - continue antiplatelet therapy, RAAS blocker, statin   - plan for Ao/iliac duplex for stent patency at future visit     LIPID MANAGEMENT  Qualifies for Statin Therapy Based on 2018 ACC/AHA Guidelines: yes, Secondary Prevention - Very high risk ASCVD - 2 major events or 1 event with other high-risk conditions  10-yr ASCVD risk score: The ASCVD Risk score (Татьяна DK, et al., 2019) failed to calculate., N/A  Major ASCVD events: Symptomatic PAD (hx of claudication with RUSTY <0.85, previous revascularization/amputation)    High-risk conditions: >64yr old  and Hypertension   Risk-enhancers: N/A  Currently on Statin: Yes  Tx goals: LDL-C  <55   At goal? no  Plan:   - continue rosuva 20mg dialy   - add zetia 10mg daily  - update labs      BLOOD PRESSURE MANAGEMENT  BP Goal ACC/AHA (2017) goal <130/80  Home BP at goal:  yes  Office BP at goal:  yes  24h ABPM:  not ordered to date   RDN candidate? 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   Lab Results   Component Value Date    HBA1C 6.2 (H) 03/11/2024   - higher components equates to higher system-wide inflammation and ASCVD and T2D risk   - estimated 2-fold increase ASCVD events for both primary/secondary prevention   - estimated 4-6-fold increase in development of full-blown T2D  Plan:  - focus on healthy macromolecules choices and overall reduced kcal diet, such as Med diet approach  - focus on body weight reduction of 5-7% as weight loss goal   - consider metformin initiation up to 850mg BID if A1c 6.2+ in future to reduce T2D progression     ANTITHROMBOTIC THERAPY   - continue ASA 81mg daily    LIFESTYLE INTERVENTIONS  TOBACCO: Stages of Change: Maintenance (sustained change >6mo)     reports that he quit smoking about 50 years ago. His smoking use included cigarettes. He started smoking about 52 years ago. He has a 1 pack-year smoking history. He has never used smokeless tobacco.   - continued complete avoidance of all tobacco products   PHYSICAL ACTIVITY: encourage daily activity targeting >150min/week of moderate-level activity or as much as tolerated  WT MGMT/NUTRITION: Mediterranean  style  and reduce Na to 1500mg/day        OTHER: none     Studies to Be Obtained: CTA chest 9/2024 - scheduled, [facility] to [facility]  Labs to Be Obtained: as noted     Follow up in: 3 months    Marky Ricardo M.D.   [facility]  [facility]  [phone]    Cc:

## 2024-04-16 NOTE — TELEPHONE ENCOUNTER
ezetimibe (ZETIA) 10 MG Tab      Received [employer] request via [employer]  for Zetia. (Quantity:100, Day Supply:100)     Insurance: [employer]  Member ID:  [id number]  BIN: [id number]  PCN: [id number]  Group: [facility]     Ran Test claim via [employer] [facility] [employer] for a $30 copay. Will outreach to patient to offer specialty pharmacy services and or release to preferred pharmacy       $10.66. qty 30T/30DS.

## 2024-04-17 NOTE — TELEPHONE ENCOUNTER
Attempted to contact patient at [phone] to discuss [facility] and services/benefits offered. No answer, left voicemail.    Adriana Marie  Rx Coordinator   [phone]

## 2024-07-09 ENCOUNTER — OFFICE VISIT (OUTPATIENT)
Dept: VASCULAR LAB | Facility: MEDICAL CENTER | Age: 79
End: 2024-07-09
Attending: NURSE PRACTITIONER
Payer: MEDICARE

## 2024-07-09 VITALS
DIASTOLIC BLOOD PRESSURE: 70 MMHG | WEIGHT: 166 LBS | HEART RATE: 56 BPM | HEIGHT: 64 IN | SYSTOLIC BLOOD PRESSURE: 148 MMHG | BODY MASS INDEX: 28.34 KG/M2

## 2024-07-09 DIAGNOSIS — I72.3 ANEURYSM OF LEFT ILIAC ARTERY (HCC): Chronic | ICD-10-CM

## 2024-07-09 DIAGNOSIS — I73.9 PAD (PERIPHERAL ARTERY DISEASE) (HCC): ICD-10-CM

## 2024-07-09 DIAGNOSIS — E78.5 DYSLIPIDEMIA, GOAL LDL BELOW 70: ICD-10-CM

## 2024-07-09 DIAGNOSIS — I77.810 ASCENDING AORTA DILATATION (HCC): ICD-10-CM

## 2024-07-09 DIAGNOSIS — I74.09 AORTOILIAC OCCLUSIVE DISEASE (HCC): Chronic | ICD-10-CM

## 2024-07-09 DIAGNOSIS — R73.03 PRE-DIABETES: ICD-10-CM

## 2024-07-09 DIAGNOSIS — Z95.828 S/P INSERTION OF ILIAC ARTERY STENT: Chronic | ICD-10-CM

## 2024-07-09 PROCEDURE — 3077F SYST BP >= 140 MM HG: CPT | Performed by: NURSE PRACTITIONER

## 2024-07-09 PROCEDURE — 99214 OFFICE O/P EST MOD 30 MIN: CPT | Performed by: NURSE PRACTITIONER

## 2024-07-09 PROCEDURE — 99212 OFFICE O/P EST SF 10 MIN: CPT

## 2024-07-09 PROCEDURE — 3078F DIAST BP <80 MM HG: CPT | Performed by: NURSE PRACTITIONER

## 2024-07-09 RX ORDER — EZETIMIBE 10 MG/1
10 TABLET ORAL DAILY
Qty: 100 TABLET | Refills: 3 | Status: SHIPPED | OUTPATIENT
Start: 2024-07-09

## 2024-07-09 ASSESSMENT — FIBROSIS 4 INDEX: FIB4 SCORE: 2.31

## 2024-07-09 ASSESSMENT — ENCOUNTER SYMPTOMS
HEMOPTYSIS: 0
ORTHOPNEA: 0
FEVER: 0
CHILLS: 0
PALPITATIONS: 0
SPUTUM PRODUCTION: 0
SHORTNESS OF BREATH: 0
WHEEZING: 0
CLAUDICATION: 0
COUGH: 0
PND: 0

## 2024-07-15 ENCOUNTER — TELEPHONE (OUTPATIENT)
Dept: VASCULAR LAB | Facility: MEDICAL CENTER | Age: 79
End: 2024-07-15
Payer: MEDICARE

## 2024-07-15 ENCOUNTER — HOSPITAL ENCOUNTER (OUTPATIENT)
Dept: LAB | Facility: MEDICAL CENTER | Age: 79
End: 2024-07-15
Attending: FAMILY MEDICINE
Payer: MEDICARE

## 2024-07-15 LAB
ALBUMIN SERPL BCP-MCNC: 4 G/DL (ref 3.2–4.9)
ALBUMIN/GLOB SERPL: 1.5 G/DL
ALP SERPL-CCNC: 57 U/L (ref 30–99)
ALT SERPL-CCNC: 16 U/L (ref 2–50)
ANION GAP SERPL CALC-SCNC: 11 MMOL/L (ref 7–16)
AST SERPL-CCNC: 18 U/L (ref 12–45)
BASOPHILS # BLD AUTO: 0.2 % (ref 0–1.8)
BASOPHILS # BLD: 0.01 K/UL (ref 0–0.12)
BILIRUB SERPL-MCNC: 0.8 MG/DL (ref 0.1–1.5)
BUN SERPL-MCNC: 20 MG/DL (ref 8–22)
CALCIUM ALBUM COR SERPL-MCNC: 9.1 MG/DL (ref 8.5–10.5)
CALCIUM SERPL-MCNC: 9.1 MG/DL (ref 8.5–10.5)
CHLORIDE SERPL-SCNC: 100 MMOL/L (ref 96–112)
CO2 SERPL-SCNC: 25 MMOL/L (ref 20–33)
CREAT SERPL-MCNC: 1.34 MG/DL (ref 0.5–1.4)
EOSINOPHIL # BLD AUTO: 0.17 K/UL (ref 0–0.51)
EOSINOPHIL NFR BLD: 4.2 % (ref 0–6.9)
ERYTHROCYTE [DISTWIDTH] IN BLOOD BY AUTOMATED COUNT: 45.1 FL (ref 35.9–50)
FASTING STATUS PATIENT QL REPORTED: NORMAL
GFR SERPLBLD CREATININE-BSD FMLA CKD-EPI: 54 ML/MIN/1.73 M 2
GLOBULIN SER CALC-MCNC: 2.7 G/DL (ref 1.9–3.5)
GLUCOSE SERPL-MCNC: 105 MG/DL (ref 65–99)
HCT VFR BLD AUTO: 38.8 % (ref 42–52)
HGB BLD-MCNC: 13.2 G/DL (ref 14–18)
IMM GRANULOCYTES # BLD AUTO: 0.01 K/UL (ref 0–0.11)
IMM GRANULOCYTES NFR BLD AUTO: 0.2 % (ref 0–0.9)
LYMPHOCYTES # BLD AUTO: 1.76 K/UL (ref 1–4.8)
LYMPHOCYTES NFR BLD: 43.2 % (ref 22–41)
MCH RBC QN AUTO: 32.3 PG (ref 27–33)
MCHC RBC AUTO-ENTMCNC: 34 G/DL (ref 32.3–36.5)
MCV RBC AUTO: 94.9 FL (ref 81.4–97.8)
MONOCYTES # BLD AUTO: 0.53 K/UL (ref 0–0.85)
MONOCYTES NFR BLD AUTO: 13 % (ref 0–13.4)
NEUTROPHILS # BLD AUTO: 1.59 K/UL (ref 1.82–7.42)
NEUTROPHILS NFR BLD: 39.2 % (ref 44–72)
NRBC # BLD AUTO: 0 K/UL
NRBC BLD-RTO: 0 /100 WBC (ref 0–0.2)
PLATELET # BLD AUTO: 202 K/UL (ref 164–446)
PMV BLD AUTO: 10 FL (ref 9–12.9)
POTASSIUM SERPL-SCNC: 4.5 MMOL/L (ref 3.6–5.5)
PROT SERPL-MCNC: 6.7 G/DL (ref 6–8.2)
PSA SERPL-MCNC: 0.17 NG/ML (ref 0–4)
RBC # BLD AUTO: 4.09 M/UL (ref 4.7–6.1)
SODIUM SERPL-SCNC: 136 MMOL/L (ref 135–145)
WBC # BLD AUTO: 4.1 K/UL (ref 4.8–10.8)

## 2024-07-15 PROCEDURE — 84270 ASSAY OF SEX HORMONE GLOBUL: CPT

## 2024-07-15 PROCEDURE — 84403 ASSAY OF TOTAL TESTOSTERONE: CPT

## 2024-07-15 PROCEDURE — 80053 COMPREHEN METABOLIC PANEL: CPT

## 2024-07-15 PROCEDURE — 36415 COLL VENOUS BLD VENIPUNCTURE: CPT

## 2024-07-15 PROCEDURE — 84402 ASSAY OF FREE TESTOSTERONE: CPT

## 2024-07-15 PROCEDURE — 84153 ASSAY OF PSA TOTAL: CPT

## 2024-07-15 PROCEDURE — 85025 COMPLETE CBC W/AUTO DIFF WBC: CPT

## 2024-07-19 ENCOUNTER — TELEPHONE (OUTPATIENT)
Dept: CARDIOLOGY | Facility: MEDICAL CENTER | Age: 79
End: 2024-07-19
Payer: MEDICARE

## 2024-07-24 LAB
SHBG SERPL-SCNC: 38 NMOL/L (ref 19–76)
TESTOST FREE MFR SERPL: 1.7 % (ref 1.6–2.9)
TESTOST FREE SERPL-MCNC: 71 PG/ML (ref 47–244)
TESTOST SERPL-MCNC: 415 NG/DL (ref 300–720)

## 2024-09-03 ENCOUNTER — HOSPITAL ENCOUNTER (OUTPATIENT)
Dept: RADIOLOGY | Facility: MEDICAL CENTER | Age: 79
End: 2024-09-03
Attending: THORACIC SURGERY (CARDIOTHORACIC VASCULAR SURGERY)
Payer: MEDICARE

## 2024-09-03 DIAGNOSIS — I71.21 ANEURYSM OF ASCENDING AORTA WITHOUT RUPTURE (HCC): ICD-10-CM

## 2024-09-03 PROCEDURE — 71275 CT ANGIOGRAPHY CHEST: CPT

## 2024-09-03 PROCEDURE — 700117 HCHG RX CONTRAST REV CODE 255: Performed by: THORACIC SURGERY (CARDIOTHORACIC VASCULAR SURGERY)

## 2024-09-03 RX ADMIN — IOHEXOL 100 ML: 350 INJECTION, SOLUTION INTRAVENOUS at 08:18

## 2024-09-11 ENCOUNTER — APPOINTMENT (OUTPATIENT)
Dept: VASCULAR LAB | Facility: MEDICAL CENTER | Age: 79
End: 2024-09-11
Payer: MEDICARE

## 2024-10-17 DIAGNOSIS — I10 PRIMARY HYPERTENSION: ICD-10-CM

## 2024-10-18 RX ORDER — HYDROCHLOROTHIAZIDE 25 MG/1
25 TABLET ORAL DAILY
Qty: 100 TABLET | Refills: 1 | Status: SHIPPED | OUTPATIENT
Start: 2024-10-18 | End: 2024-10-21 | Stop reason: SDUPTHER

## 2024-10-21 ENCOUNTER — OFFICE VISIT (OUTPATIENT)
Dept: VASCULAR LAB | Facility: MEDICAL CENTER | Age: 79
End: 2024-10-21
Attending: NURSE PRACTITIONER
Payer: MEDICARE

## 2024-10-21 VITALS
HEART RATE: 55 BPM | SYSTOLIC BLOOD PRESSURE: 149 MMHG | BODY MASS INDEX: 28.34 KG/M2 | DIASTOLIC BLOOD PRESSURE: 72 MMHG | HEIGHT: 64 IN | WEIGHT: 166 LBS

## 2024-10-21 DIAGNOSIS — R73.03 PRE-DIABETES: ICD-10-CM

## 2024-10-21 DIAGNOSIS — E78.5 DYSLIPIDEMIA, GOAL LDL BELOW 70: ICD-10-CM

## 2024-10-21 DIAGNOSIS — I73.9 PAD (PERIPHERAL ARTERY DISEASE) (HCC): ICD-10-CM

## 2024-10-21 DIAGNOSIS — I71.21 ANEURYSM OF ASCENDING AORTA WITHOUT RUPTURE (HCC): Chronic | ICD-10-CM

## 2024-10-21 DIAGNOSIS — I74.09 AORTOILIAC OCCLUSIVE DISEASE (HCC): Chronic | ICD-10-CM

## 2024-10-21 DIAGNOSIS — I10 PRIMARY HYPERTENSION: ICD-10-CM

## 2024-10-21 DIAGNOSIS — I72.3 ANEURYSM OF LEFT ILIAC ARTERY (HCC): Chronic | ICD-10-CM

## 2024-10-21 DIAGNOSIS — Z95.828 S/P INSERTION OF ILIAC ARTERY STENT: Chronic | ICD-10-CM

## 2024-10-21 DIAGNOSIS — I77.810 ASCENDING AORTA DILATATION (HCC): ICD-10-CM

## 2024-10-21 PROCEDURE — 3078F DIAST BP <80 MM HG: CPT | Performed by: NURSE PRACTITIONER

## 2024-10-21 PROCEDURE — 3077F SYST BP >= 140 MM HG: CPT | Performed by: NURSE PRACTITIONER

## 2024-10-21 PROCEDURE — 99212 OFFICE O/P EST SF 10 MIN: CPT

## 2024-10-21 PROCEDURE — 99214 OFFICE O/P EST MOD 30 MIN: CPT | Performed by: NURSE PRACTITIONER

## 2024-10-21 RX ORDER — OLMESARTAN MEDOXOMIL 40 MG/1
40 TABLET ORAL DAILY
Qty: 100 TABLET | Refills: 3 | Status: SHIPPED | OUTPATIENT
Start: 2024-10-21

## 2024-10-21 RX ORDER — HYDROCHLOROTHIAZIDE 12.5 MG/1
12.5 TABLET ORAL DAILY
Qty: 100 TABLET | Refills: 3 | Status: SHIPPED | OUTPATIENT
Start: 2024-10-21

## 2024-10-21 ASSESSMENT — ENCOUNTER SYMPTOMS
PALPITATIONS: 0
CHILLS: 0
PND: 0
FEVER: 0
HEMOPTYSIS: 0
ORTHOPNEA: 0
WHEEZING: 0
SHORTNESS OF BREATH: 0
COUGH: 0
SPUTUM PRODUCTION: 0
CLAUDICATION: 0

## 2024-10-21 ASSESSMENT — FIBROSIS 4 INDEX: FIB4 SCORE: 1.76

## 2024-11-18 ENCOUNTER — APPOINTMENT (OUTPATIENT)
Dept: MEDICAL GROUP | Facility: MEDICAL CENTER | Age: 79
End: 2024-11-18
Payer: MEDICARE

## 2024-12-16 ENCOUNTER — HOSPITAL ENCOUNTER (OUTPATIENT)
Dept: LAB | Facility: MEDICAL CENTER | Age: 79
End: 2024-12-16
Attending: NURSE PRACTITIONER
Payer: MEDICARE

## 2024-12-16 ENCOUNTER — HOSPITAL ENCOUNTER (OUTPATIENT)
Dept: LAB | Facility: MEDICAL CENTER | Age: 79
End: 2024-12-16
Attending: INTERNAL MEDICINE
Payer: MEDICARE

## 2024-12-16 DIAGNOSIS — E78.5 DYSLIPIDEMIA, GOAL LDL BELOW 70: ICD-10-CM

## 2024-12-16 DIAGNOSIS — I10 PRIMARY HYPERTENSION: ICD-10-CM

## 2024-12-16 DIAGNOSIS — R73.03 PRE-DIABETES: ICD-10-CM

## 2024-12-16 LAB
ALBUMIN SERPL BCP-MCNC: 4.2 G/DL (ref 3.2–4.9)
ALBUMIN SERPL BCP-MCNC: 4.3 G/DL (ref 3.2–4.9)
ALBUMIN/GLOB SERPL: 1.6 G/DL
ALBUMIN/GLOB SERPL: 1.6 G/DL
ALP SERPL-CCNC: 58 U/L (ref 30–99)
ALP SERPL-CCNC: 59 U/L (ref 30–99)
ALT SERPL-CCNC: 24 U/L (ref 2–50)
ALT SERPL-CCNC: 26 U/L (ref 2–50)
ANION GAP SERPL CALC-SCNC: 10 MMOL/L (ref 7–16)
ANION GAP SERPL CALC-SCNC: 9 MMOL/L (ref 7–16)
AST SERPL-CCNC: 24 U/L (ref 12–45)
AST SERPL-CCNC: 25 U/L (ref 12–45)
BASOPHILS # BLD AUTO: 0.2 % (ref 0–1.8)
BASOPHILS # BLD: 0.01 K/UL (ref 0–0.12)
BILIRUB SERPL-MCNC: 0.4 MG/DL (ref 0.1–1.5)
BILIRUB SERPL-MCNC: 0.4 MG/DL (ref 0.1–1.5)
BUN SERPL-MCNC: 17 MG/DL (ref 8–22)
BUN SERPL-MCNC: 17 MG/DL (ref 8–22)
CALCIUM ALBUM COR SERPL-MCNC: 8.3 MG/DL (ref 8.5–10.5)
CALCIUM ALBUM COR SERPL-MCNC: 8.5 MG/DL (ref 8.5–10.5)
CALCIUM SERPL-MCNC: 8.5 MG/DL (ref 8.5–10.5)
CALCIUM SERPL-MCNC: 8.7 MG/DL (ref 8.5–10.5)
CHLORIDE SERPL-SCNC: 100 MMOL/L (ref 96–112)
CHLORIDE SERPL-SCNC: 100 MMOL/L (ref 96–112)
CHOLEST SERPL-MCNC: 127 MG/DL (ref 100–199)
CO2 SERPL-SCNC: 25 MMOL/L (ref 20–33)
CO2 SERPL-SCNC: 25 MMOL/L (ref 20–33)
CREAT SERPL-MCNC: 1.24 MG/DL (ref 0.5–1.4)
CREAT SERPL-MCNC: 1.26 MG/DL (ref 0.5–1.4)
CRP SERPL HS-MCNC: 7.7 MG/L (ref 0–3)
EOSINOPHIL # BLD AUTO: 0.08 K/UL (ref 0–0.51)
EOSINOPHIL NFR BLD: 1.5 % (ref 0–6.9)
ERYTHROCYTE [DISTWIDTH] IN BLOOD BY AUTOMATED COUNT: 45.1 FL (ref 35.9–50)
EST. AVERAGE GLUCOSE BLD GHB EST-MCNC: 131 MG/DL
FERRITIN SERPL-MCNC: 295 NG/ML (ref 22–322)
FOLATE SERPL-MCNC: 16.4 NG/ML
GFR SERPLBLD CREATININE-BSD FMLA CKD-EPI: 58 ML/MIN/1.73 M 2
GFR SERPLBLD CREATININE-BSD FMLA CKD-EPI: 59 ML/MIN/1.73 M 2
GLOBULIN SER CALC-MCNC: 2.7 G/DL (ref 1.9–3.5)
GLOBULIN SER CALC-MCNC: 2.7 G/DL (ref 1.9–3.5)
GLUCOSE SERPL-MCNC: 106 MG/DL (ref 65–99)
GLUCOSE SERPL-MCNC: 106 MG/DL (ref 65–99)
HBA1C MFR BLD: 6.2 % (ref 4–5.6)
HCT VFR BLD AUTO: 40.4 % (ref 42–52)
HDLC SERPL-MCNC: 36 MG/DL
HGB BLD-MCNC: 13.6 G/DL (ref 14–18)
IMM GRANULOCYTES # BLD AUTO: 0.02 K/UL (ref 0–0.11)
IMM GRANULOCYTES NFR BLD AUTO: 0.4 % (ref 0–0.9)
IRON SATN MFR SERPL: 16 % (ref 15–55)
IRON SERPL-MCNC: 44 UG/DL (ref 50–180)
LDH SERPL L TO P-CCNC: 172 U/L (ref 107–266)
LDLC SERPL CALC-MCNC: 68 MG/DL
LYMPHOCYTES # BLD AUTO: 1.7 K/UL (ref 1–4.8)
LYMPHOCYTES NFR BLD: 32.8 % (ref 22–41)
MCH RBC QN AUTO: 32.1 PG (ref 27–33)
MCHC RBC AUTO-ENTMCNC: 33.7 G/DL (ref 32.3–36.5)
MCV RBC AUTO: 95.3 FL (ref 81.4–97.8)
MONOCYTES # BLD AUTO: 0.56 K/UL (ref 0–0.85)
MONOCYTES NFR BLD AUTO: 10.8 % (ref 0–13.4)
NEUTROPHILS # BLD AUTO: 2.81 K/UL (ref 1.82–7.42)
NEUTROPHILS NFR BLD: 54.3 % (ref 44–72)
NRBC # BLD AUTO: 0 K/UL
NRBC BLD-RTO: 0 /100 WBC (ref 0–0.2)
PLATELET # BLD AUTO: 219 K/UL (ref 164–446)
PMV BLD AUTO: 10 FL (ref 9–12.9)
POTASSIUM SERPL-SCNC: 3.8 MMOL/L (ref 3.6–5.5)
POTASSIUM SERPL-SCNC: 3.9 MMOL/L (ref 3.6–5.5)
PROT SERPL-MCNC: 6.9 G/DL (ref 6–8.2)
PROT SERPL-MCNC: 7 G/DL (ref 6–8.2)
PSA SERPL DL<=0.01 NG/ML-MCNC: 0.07 NG/ML (ref 0–4)
RBC # BLD AUTO: 4.24 M/UL (ref 4.7–6.1)
SODIUM SERPL-SCNC: 134 MMOL/L (ref 135–145)
SODIUM SERPL-SCNC: 135 MMOL/L (ref 135–145)
TIBC SERPL-MCNC: 274 UG/DL (ref 250–450)
TRIGL SERPL-MCNC: 115 MG/DL (ref 0–149)
UIBC SERPL-MCNC: 230 UG/DL (ref 110–370)
VIT B12 SERPL-MCNC: 431 PG/ML (ref 211–911)
WBC # BLD AUTO: 5.2 K/UL (ref 4.8–10.8)

## 2024-12-16 PROCEDURE — 83036 HEMOGLOBIN GLYCOSYLATED A1C: CPT

## 2024-12-16 PROCEDURE — 80053 COMPREHEN METABOLIC PANEL: CPT

## 2024-12-16 PROCEDURE — 86141 C-REACTIVE PROTEIN HS: CPT

## 2024-12-16 PROCEDURE — 83540 ASSAY OF IRON: CPT

## 2024-12-16 PROCEDURE — 85025 COMPLETE CBC W/AUTO DIFF WBC: CPT

## 2024-12-16 PROCEDURE — 83550 IRON BINDING TEST: CPT

## 2024-12-16 PROCEDURE — 82607 VITAMIN B-12: CPT

## 2024-12-16 PROCEDURE — 36415 COLL VENOUS BLD VENIPUNCTURE: CPT

## 2024-12-16 PROCEDURE — 84153 ASSAY OF PSA TOTAL: CPT

## 2024-12-16 PROCEDURE — 83615 LACTATE (LD) (LDH) ENZYME: CPT

## 2024-12-16 PROCEDURE — 80061 LIPID PANEL: CPT

## 2024-12-16 PROCEDURE — 82728 ASSAY OF FERRITIN: CPT

## 2024-12-16 PROCEDURE — 83695 ASSAY OF LIPOPROTEIN(A): CPT

## 2024-12-16 PROCEDURE — 80053 COMPREHEN METABOLIC PANEL: CPT | Mod: 91

## 2024-12-16 PROCEDURE — 82746 ASSAY OF FOLIC ACID SERUM: CPT

## 2024-12-18 LAB — LPA SERPL-MCNC: 19 MG/DL

## 2025-01-12 ENCOUNTER — PATIENT MESSAGE (OUTPATIENT)
Dept: VASCULAR LAB | Facility: MEDICAL CENTER | Age: 80
End: 2025-01-12
Payer: MEDICARE

## 2025-01-12 DIAGNOSIS — I10 PRIMARY HYPERTENSION: ICD-10-CM

## 2025-01-16 RX ORDER — AMLODIPINE BESYLATE 5 MG/1
5 TABLET ORAL DAILY
Qty: 90 TABLET | Refills: 3 | Status: SHIPPED | OUTPATIENT
Start: 2025-01-16 | End: 2026-02-20

## 2025-01-17 NOTE — PROGRESS NOTES
Pt bps running 140-180s/70-80s per home log.    Will add on amlodipine 5mg daily, rx sent to pharmacy on file.  Pt to continue keeping bp log and bring in to next appt.  Has follow up at end of month.  Will discuss further at that time.  Kadi PARK  [facility]

## 2025-01-29 ENCOUNTER — OFFICE VISIT (OUTPATIENT)
Dept: VASCULAR LAB | Facility: MEDICAL CENTER | Age: 80
End: 2025-01-29
Attending: NURSE PRACTITIONER
Payer: MEDICARE

## 2025-01-29 VITALS
HEIGHT: 64 IN | DIASTOLIC BLOOD PRESSURE: 70 MMHG | BODY MASS INDEX: 27.83 KG/M2 | WEIGHT: 163 LBS | SYSTOLIC BLOOD PRESSURE: 130 MMHG | HEART RATE: 61 BPM

## 2025-01-29 DIAGNOSIS — R73.03 PRE-DIABETES: ICD-10-CM

## 2025-01-29 DIAGNOSIS — I10 PRIMARY HYPERTENSION: ICD-10-CM

## 2025-01-29 DIAGNOSIS — I74.09 AORTOILIAC OCCLUSIVE DISEASE (HCC): ICD-10-CM

## 2025-01-29 DIAGNOSIS — Z95.828 S/P INSERTION OF ILIAC ARTERY STENT: Chronic | ICD-10-CM

## 2025-01-29 DIAGNOSIS — E78.5 DYSLIPIDEMIA, GOAL LDL BELOW 70: ICD-10-CM

## 2025-01-29 DIAGNOSIS — I72.3 ANEURYSM OF LEFT ILIAC ARTERY (HCC): Chronic | ICD-10-CM

## 2025-01-29 DIAGNOSIS — I77.810 ASCENDING AORTA DILATATION (HCC): ICD-10-CM

## 2025-01-29 DIAGNOSIS — I73.9 PAD (PERIPHERAL ARTERY DISEASE) (HCC): ICD-10-CM

## 2025-01-29 PROCEDURE — 99212 OFFICE O/P EST SF 10 MIN: CPT

## 2025-01-29 ASSESSMENT — ENCOUNTER SYMPTOMS
HEMOPTYSIS: 0
ORTHOPNEA: 0
SHORTNESS OF BREATH: 0
WHEEZING: 0
PALPITATIONS: 0
SPUTUM PRODUCTION: 0
CLAUDICATION: 0
FEVER: 0
COUGH: 0
PND: 0
CHILLS: 0

## 2025-01-29 ASSESSMENT — FIBROSIS 4 INDEX: FIB4 SCORE: 1.84

## 2025-01-29 NOTE — PROGRESS NOTES
VASCULAR MEDICINE CLINIC - Follow up VISIT  2025    Jasiel Silver is a 79 y.o. male  who has been referred for vascular medicine evaluation and management ascending aortic aneurysm (AsAA), est 2024   Referring provider: Taiwo Iyer (CT surg)     Subjective      AsAA   Initial visit hx/sx: seen by cardiology and CT surg (3/7/24).   Reports mild CP occasionally lasting 1-2 seconds  No back pain or belly pain     PAD, s/p L iliac art stenting (, Dr. Viramontes)  L internal iliac art coiling for aneurysm:  Feeling well.  No claudication.  Last RUSTY 2018.  No other new sx    HTN: Stable, tolerating meds with good adherence, Home BP los-130's/70s    HLD: Stable, current treatment: High intensity statin - tolerating, good adherence; did not start zetia for unclear reasons   Antithrombotic tx: Yes, Details: ASA 81mg , no bleeding noted       ALLERGIES  Nkda [no known drug allergy]    Social History     Tobacco Use    Smoking status: Former     Current packs/day: 0.00     Average packs/day: 0.5 packs/day for 2.0 years (1.0 ttl pk-yrs)     Types: Cigarettes     Start date: 10/16/1971     Quit date: 10/16/1973     Years since quittin.3    Smokeless tobacco: Never    Tobacco comments:     Quit    Vaping Use    Vaping status: Never Used   Substance Use Topics    Alcohol use: Not Currently     Alcohol/week: 0.6 oz     Types: 1 Glasses of wine per week     Comment: occ    Drug use: No     DIET AND EXERCISE:  Weight Change:stable  Diet: common adult  Exercise: moderate regular exercise program     Review of Systems   Constitutional:  Negative for chills, fever and malaise/fatigue.   Respiratory:  Negative for cough, hemoptysis, sputum production, shortness of breath and wheezing.    Cardiovascular:  Negative for chest pain, palpitations, orthopnea, claudication, leg swelling and PND.          Objective    Vitals:    25 1427   BP: 130/70   BP Location: Left arm   Patient Position:  "Sitting   BP Cuff Size: Adult long   Pulse: 61   Weight: 73.9 kg (163 lb)   Height: 1.626 m (5' 4\")     BP Readings from Last 4 Encounters:   01/29/25 130/70   10/21/24 (!) 149/72   07/09/24 (!) 148/70   04/16/24 124/78      Body mass index is 27.98 kg/m².   Wt Readings from Last 4 Encounters:   01/29/25 73.9 kg (163 lb)   10/21/24 75.3 kg (166 lb)   07/09/24 75.3 kg (166 lb)   04/16/24 75.3 kg (166 lb)      Physical Exam  Constitutional:       General: He is not in acute distress.     Appearance: Normal appearance. He is not diaphoretic.   HENT:      Head: Normocephalic and atraumatic.   Eyes:      Conjunctiva/sclera: Conjunctivae normal.   Cardiovascular:      Rate and Rhythm: Normal rate and regular rhythm.      Pulses:           Carotid pulses are 2+ on the right side and 2+ on the left side.       Radial pulses are 2+ on the right side and 2+ on the left side.        Dorsalis pedis pulses are 2+ on the right side and 2+ on the left side.        Posterior tibial pulses are 2+ on the right side and 2+ on the left side.      Heart sounds: Normal heart sounds.   Pulmonary:      Effort: Pulmonary effort is normal.      Breath sounds: Normal breath sounds.   Musculoskeletal:      Right lower leg: No edema.      Left lower leg: No edema.   Skin:     General: Skin is warm and dry.   Neurological:      Mental Status: He is alert and oriented to person, place, and time.      Cranial Nerves: No cranial nerve deficit.      Gait: Gait is intact.   Psychiatric:         Mood and Affect: Mood and affect normal.        DATA REVIEW    Lab Results   Component Value Date/Time    CHOLSTRLTOT 127 12/16/2024 06:36 AM    LDL 68 12/16/2024 06:36 AM    HDL 36 (A) 12/16/2024 06:36 AM    TRIGLYCERIDE 115 12/16/2024 06:36 AM       Lab Results   Component Value Date/Time    SODIUM 135 12/16/2024 06:36 AM    POTASSIUM 3.9 12/16/2024 06:36 AM    CHLORIDE 100 12/16/2024 06:36 AM    CO2 25 12/16/2024 06:36 AM    GLUCOSE 106 (H) 12/16/2024 06:36 " AM    BUN 17 12/16/2024 06:36 AM    CREATININE 1.24 12/16/2024 06:36 AM     Lab Results   Component Value Date/Time    ALKPHOSPHAT 58 12/16/2024 06:36 AM    ASTSGOT 25 12/16/2024 06:36 AM    ALTSGPT 24 12/16/2024 06:36 AM    TBILIRUBIN 0.4 12/16/2024 06:36 AM       Lab Results   Component Value Date/Time    HBA1C 6.2 (H) 12/16/2024 06:36 AM       Lab Results   Component Value Date/Time    MALBCRT 22 01/23/2019 07:40 AM    MICROALBUR 0.8 01/23/2019 07:40 AM       Cardiovascular Imaging:    OP note 2009 (Dr. Viramontes)  POSTOPERATIVE DIAGNOSIS:  Symptomatic left common iliac artery aneurysm.  OPERATIONS PERFORMED  1.  Left common femoral artery exposure.  2.  Percutaneous cannulation of right common femoral artery under ultrasound guidance.  3.  Catheter, aorta.  4.  Selective catheterization of left hypogastric artery from contralateral approach.  5.  Proximal left hypogastric artery embolization using the 10-mm Amplatzer plug.  6.  Endovascular repair of symptomatic left [id number]-dr-[id number] common iliac artery aneurysm using 13 x 85 mm and 13 x 55 mm iliac limbs.    CTA thoracic Ao 2013  1. Aneurysmal dilatation of the ascending thoracic aorta to 4.4 cm.   2. Status post stenting of left common iliac and external iliac arteries.   3. Diverticulosis without evidence of diverticulitis.   4. Left renal cyst.     Ao/iliac duplex 2018 (no final report)   No evidence of abdominal aortic or iliac artery aneurysm. Common iliac    diameter:    Right-  1.3 cm            Left-   1.3 cm   Triphasic flow throughout the abdominal aorta and bilateral iliac arteries   with    no evidence of significant stenosis.   Patent stent noted in the left common iliac artery.   No stenosis of the proximal segment of the major visceral arteries.    RUSTY 2018  Normal bilateral lower extremity arterial physiologic study at rest.     Echo 2019  Compared to the images of the prior study done 12/28/2017, Aortic valve   stenosis slightly progressed.  Aortic  root and the ascending aorta dilatation.Measures 4.4 cm.  Left ventricular ejection fraction is visually estimated to be 60%.  Normal diastolic function.  Mild to moderate aortic stenosis. Mild aortic insufficiency.    Echo 8/2023  Compared to the prior study on 04/16/2021, ascending aorta size has increased.  The left ventricular ejection fraction is estimated to be 60%.  Tricuspid aortic valve.  Mild-moderate aortic insufficiency.  The ascending aorta is dilated with a diameter of 4.8 cm.     MPI 8/2023   NUCLEAR IMAGING INTERPRETATION   No evidence of significant jeopardized viable myocardium or prior myocardial    infarction.   Normal left ventricular size, ejection fraction, and wall motion.   ECG INTERPRETATION   Nondiagnostic due to resting ECG.    CTA chest 2/17/24   1. 5 cm ascending thoracic aortic aneurysm.  2. 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  3. Cholelithiasis.  4. Simple, Bosniak 1, left renal cysts which do not require follow-up.  5. Colonic diverticulosis.    Carotid 3/2024  Normal bilateral carotid exam.     CTA chest 9/7/24 - pending/ordered        Medical Decision Making:  Today's Assessment / Status / Plan:     1. Ascending aorta dilatation (HCC)        2. S/P insertion of iliac artery stent        3. Aneurysm of left iliac artery (HCC)        4. Pre-diabetes          Etiology of Established CVD if Present:     1) Ascending aortic aneurysm - stable, asymptomatic.  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  - No reported fhx or pmhx of connective tissue disease  - Echo shows no biscupid Ao valve  - at initial visit - reviewed anatomy, risks, emergency s/s requiring immediate attention, and thresholds for surgical intervention and gave handout   - saw CT surgery in 3/2024- elected currently for medical management and close surveillance  Plan:   - continue med mgmt - focus BB and ARB as 1st line BP agents   - recommend 1st degree relatives get screened with echo for TAA as recommended by 2022 ACC guideline  - avoid all tobacco products and prescription or illicit stimulants   - activity restrictions include no long duration high intensity cardio, extreme endurance activities, high-intensity strength training >20-30lbs, and avoid straining or holding breath when lifting  - reviewed emergency s/s requiring immediate attention   - check echo sept 2025    2) Left common iliac aneurysm, s/p stenting 2009 - stable, no claudication  2009 - s/p L iliac   2018 duplex = stable repair   - No indications of true chronic limb threatening ischemia (CLTI) changes or hx of lifestyle-limiting symptoms   Millie class:  I  Change in Pain-free walking distance: n/a  Change in Maximum walking distance: n/a  - as reviewed with patient, evidenced-based standard of care includes risk factor reduction, med mgmt, and exercise therapy with limited need for surgical intervention in majority of patients if there is CLTI or severe lifestyle limiting symptoms after >6mo of standard therapy   Plan:  - continue TLC measures and continued complete cessation of tobacco products   - continue structured exercise therapy for 12 weeks as outlined in care instructions, monitor pain-free and total walking distance to monitor progress  - continue antiplatelet therapy, RAAS blocker, statin   - plan for Ao/iliac  duplex in Feb 2026    LIPID MANAGEMENT  Qualifies for Statin Therapy Based on 2018 ACC/AHA Guidelines: yes, Secondary Prevention - Very high risk ASCVD - 2 major events or 1 event with other high-risk conditions  10-yr ASCVD risk score: The ASCVD Risk score (Татьяна ROMO, et al., 2019) failed to calculate., N/A  Major ASCVD events: Symptomatic PAD (hx of claudication with RUSTY <0.85, previous revascularization/amputation)    High-risk conditions: >64yr old  and Hypertension   Risk-enhancers: N/A  Currently on Statin: Yes  Tx goals: LDL-C <55   At goal? Unknown, has not been checked since starting zetia  Plan:   - continue rosuva 20mg dialy   - continue zetia 10mg daily  - recheck lipid panel now    BLOOD PRESSURE MANAGEMENT  BP Goal ACC/AHA (2017) goal <130/80  Home BP at goal:  yes  Office BP at goal:  yes  24h ABPM:  not ordered to date   RDN candidate? NO  Contributing factors: n/a   BP meds recommended for reduction of expansion rate of TAA:   - Beta-blocker (anti-impulse therapy to decrease pulsatile wall stress)   - ARB (inhibits TGF-beta activity in aortic wall,, most widely studied is losartan)   Plan:   Monitoring:   - start/continue home BP monitoring, reviewed correct technique, provide BP log and instructions  Medications:  - continue olmesartan 40mg daily   - continue HCTZ 12.5mg daily   - continue carvedilol 6.25mg BID     GLYCEMIC MANAGEMENT Prediabetic with MetS - 4/5 components   Lab Results   Component Value Date    HBA1C 6.2 (H) 12/16/2024   - higher components equates to higher system-wide inflammation and ASCVD and T2D risk   - estimated 2-fold increase ASCVD events for both primary/secondary prevention   - estimated 4-6-fold increase in development of full-blown T2D  Plan:  - focus on healthy macromolecules choices and overall reduced kcal diet, such as Med diet approach  - focus on body weight reduction of 5-7% as weight loss goal   - consider metformin initiation up to 850mg BID if A1c 6.2+ in  future to reduce T2D progression     ANTITHROMBOTIC THERAPY   - continue ASA 81mg daily    LIFESTYLE INTERVENTIONS  TOBACCO: Stages of Change: Maintenance (sustained change >6mo)     reports that he quit smoking about 51 years ago. His smoking use included cigarettes. He started smoking about 53 years ago. He has a 1 pack-year smoking history. He has never used smokeless tobacco.   - continued complete avoidance of all tobacco products   PHYSICAL ACTIVITY: encourage daily activity targeting >150min/week of moderate-level activity or as much as tolerated  WT MGMT/NUTRITION: Mediterranean style  and reduce Na to 1500mg/day        OTHER: none     Studies to Be Obtained: aorto/iliac duplex- Feb 2026  Echo- Sept 2025   Labs to Be Obtained: as re-ordered today     Follow up in: 6 months      KISHORE Covington   [facility]  [facility] [place] [facility]  [phone]

## 2025-03-30 DIAGNOSIS — I77.810 ASCENDING AORTA DILATATION (HCC): ICD-10-CM

## 2025-03-30 DIAGNOSIS — I10 PRIMARY HYPERTENSION: ICD-10-CM

## 2025-03-31 ENCOUNTER — TELEPHONE (OUTPATIENT)
Dept: CARDIOLOGY | Facility: MEDICAL CENTER | Age: 80
End: 2025-03-31
Payer: MEDICARE

## 2025-03-31 RX ORDER — CARVEDILOL 6.25 MG/1
6.25 TABLET ORAL 2 TIMES DAILY WITH MEALS
Qty: 200 TABLET | Refills: 0 | Status: SHIPPED | OUTPATIENT
Start: 2025-03-31

## 2025-03-31 NOTE — TELEPHONE ENCOUNTER
Is the patient due for a refill? Yes    Was the patient seen the last 15 months? Yes    Date of last office visit: 03.20.2024    Does the patient have an upcoming appointment?  No    Provider to refill:LH    Does the patient have [facility] and need 100-day supply? (This applies to ALL medications) Yes, quantity updated to 100 days

## 2025-03-31 NOTE — TELEPHONE ENCOUNTER
100 day (SC[facility]) courtesy refill for carvedilol sent. Pt needs follow up appt. Last seen 3/20/24. POWWOW message sent to pt.    To Scheduling - please contact pt to schedule annual visit. Thank you!

## 2025-04-02 DIAGNOSIS — I77.810 ASCENDING AORTA DILATATION (HCC): ICD-10-CM

## 2025-04-02 DIAGNOSIS — I71.21 ANEURYSM OF ASCENDING AORTA WITHOUT RUPTURE (HCC): ICD-10-CM

## 2025-04-02 NOTE — PROGRESS NOTES
Echo- Sept 2025     Orders placed for vascular surveillance imaging.    Solulink message sent to pt to remind that they are due for their surveillance vascular imaging.       Yamini Colón R.N.   [facility]

## 2025-04-09 ENCOUNTER — HOSPITAL ENCOUNTER (OUTPATIENT)
Dept: LAB | Facility: MEDICAL CENTER | Age: 80
End: 2025-04-09
Attending: FAMILY MEDICINE
Payer: MEDICARE

## 2025-04-09 LAB
BASOPHILS # BLD AUTO: 0.4 % (ref 0–1.8)
BASOPHILS # BLD: 0.02 K/UL (ref 0–0.12)
CK SERPL-CCNC: 220 U/L (ref 0–154)
EOSINOPHIL # BLD AUTO: 0.24 K/UL (ref 0–0.51)
EOSINOPHIL NFR BLD: 5.1 % (ref 0–6.9)
ERYTHROCYTE [DISTWIDTH] IN BLOOD BY AUTOMATED COUNT: 45.3 FL (ref 35.9–50)
GGT SERPL-CCNC: 55 U/L (ref 7–51)
HCT VFR BLD AUTO: 41.3 % (ref 42–52)
HGB BLD-MCNC: 13.9 G/DL (ref 14–18)
IMM GRANULOCYTES # BLD AUTO: 0.01 K/UL (ref 0–0.11)
IMM GRANULOCYTES NFR BLD AUTO: 0.2 % (ref 0–0.9)
LYMPHOCYTES # BLD AUTO: 2.22 K/UL (ref 1–4.8)
LYMPHOCYTES NFR BLD: 47 % (ref 22–41)
MCH RBC QN AUTO: 31.7 PG (ref 27–33)
MCHC RBC AUTO-ENTMCNC: 33.7 G/DL (ref 32.3–36.5)
MCV RBC AUTO: 94.1 FL (ref 81.4–97.8)
MONOCYTES # BLD AUTO: 0.51 K/UL (ref 0–0.85)
MONOCYTES NFR BLD AUTO: 10.8 % (ref 0–13.4)
NEUTROPHILS # BLD AUTO: 1.72 K/UL (ref 1.82–7.42)
NEUTROPHILS NFR BLD: 36.5 % (ref 44–72)
NRBC # BLD AUTO: 0 K/UL
NRBC BLD-RTO: 0 /100 WBC (ref 0–0.2)
PLATELET # BLD AUTO: 222 K/UL (ref 164–446)
PMV BLD AUTO: 9.7 FL (ref 9–12.9)
RBC # BLD AUTO: 4.39 M/UL (ref 4.7–6.1)
WBC # BLD AUTO: 4.7 K/UL (ref 4.8–10.8)

## 2025-04-09 PROCEDURE — 36415 COLL VENOUS BLD VENIPUNCTURE: CPT

## 2025-04-09 PROCEDURE — 82977 ASSAY OF GGT: CPT

## 2025-04-09 PROCEDURE — 82550 ASSAY OF CK (CPK): CPT

## 2025-04-09 PROCEDURE — 85025 COMPLETE CBC W/AUTO DIFF WBC: CPT

## 2025-05-08 ENCOUNTER — OFFICE VISIT (OUTPATIENT)
Dept: CARDIOLOGY | Facility: MEDICAL CENTER | Age: 80
End: 2025-05-08
Payer: MEDICARE

## 2025-05-08 VITALS
HEIGHT: 64 IN | OXYGEN SATURATION: 95 % | DIASTOLIC BLOOD PRESSURE: 60 MMHG | WEIGHT: 165.2 LBS | HEART RATE: 60 BPM | RESPIRATION RATE: 16 BRPM | BODY MASS INDEX: 28.2 KG/M2 | SYSTOLIC BLOOD PRESSURE: 116 MMHG

## 2025-05-08 DIAGNOSIS — I35.1 NONRHEUMATIC AORTIC VALVE INSUFFICIENCY: ICD-10-CM

## 2025-05-08 DIAGNOSIS — I77.810 ASCENDING AORTA DILATATION (HCC): ICD-10-CM

## 2025-05-08 DIAGNOSIS — I10 PRIMARY HYPERTENSION: ICD-10-CM

## 2025-05-08 DIAGNOSIS — E78.5 DYSLIPIDEMIA, GOAL LDL BELOW 70: ICD-10-CM

## 2025-05-08 PROBLEM — N18.31 CHRONIC KIDNEY DISEASE, STAGE 3A: Status: ACTIVE | Noted: 2025-05-08

## 2025-05-08 PROCEDURE — 3074F SYST BP LT 130 MM HG: CPT

## 2025-05-08 PROCEDURE — 99213 OFFICE O/P EST LOW 20 MIN: CPT

## 2025-05-08 PROCEDURE — 99214 OFFICE O/P EST MOD 30 MIN: CPT

## 2025-05-08 PROCEDURE — 3078F DIAST BP <80 MM HG: CPT

## 2025-05-08 RX ORDER — AMLODIPINE BESYLATE 10 MG/1
10 TABLET ORAL DAILY
Qty: 100 TABLET | Refills: 3 | Status: SHIPPED | OUTPATIENT
Start: 2025-05-08 | End: 2026-06-12

## 2025-05-08 ASSESSMENT — ENCOUNTER SYMPTOMS
PND: 0
MUSCULOSKELETAL NEGATIVE: 1
ORTHOPNEA: 0
DEPRESSION: 0
NEUROLOGICAL NEGATIVE: 1
PALPITATIONS: 0
HEARTBURN: 1
NERVOUS/ANXIOUS: 0
EYES NEGATIVE: 1
SHORTNESS OF BREATH: 0
CONSTITUTIONAL NEGATIVE: 1

## 2025-05-08 ASSESSMENT — FIBROSIS 4 INDEX: FIB4 SCORE: 1.82

## 2025-05-08 NOTE — PROGRESS NOTES
Chief Complaint   Patient presents with    Other     Ascending aorta dilatation (HCC)       Subjective     Jasiel Silver is a 79 y.o. male who presents today for follow up. They have a history of aortic aneurysm, dyslipidemia, hypertension, aortic valve insufficiency, PAD     Seen by Dr. Alicea on 8/16/2023, at that visit he was having anginal symptoms, a cardiac stress test and echocardiogram were ordered, he was also started on aspirin and had his rosuvastatin dose increased for goal LDL of less than 70      Seen by myself on 11/14/2023 he has been having left breast pain.  He points out that his left breast is larger than his right. pain in his left breast that is not associated with activity.  He has infrequent palpitations, denies shortness of breath, does have some mild leg swelling. He plays tennis 2x per week for 2-3 hours.  I ordered a CT aorta and lipid panel.      At visit with myself on 2/22/2024 he has been feeling really well overall.  Denies chest pain, shortness of breath, or other cardiac symptoms. I referred him to CTS for his ascending aortic aneurysm     He was seen by Dr. Iyer on 3/7/2024 at that visit he was recommended to undergo surgical intervention.  However patient elected medical management and repeat imaging in 3 to 6 months.      3/20/2024 he has been feeling overall well, no significant cardiac symptoms.  He is planning to go to the [facility] at some point over the summer.    5/8/2025 he has been feeling well other than some itching which has been bothering him.  He has been discussing this with his primary care provider, he is concerned that it could be his liver kidney function and is wanting to do a CMP which I have ordered.  His blood pressures have been ranging between 120-160 at home.  He does report some pain in the epigastric region that he reports is relieved by Protonix.  No shortness of breath, lower extremity edema, or palpitations    Past Medical History:    Diagnosis Date    Aortoiliac occlusive disease (HCC) 2009    Status post left iliac aneurysm repair    Arthritis          Ascending aorta dilatation ([facility]) 10/2013    CT angiogram with ascending aorta at 4.4cm.    Carotid stenosis      Less than 50% stenosis    Carotid Stenosis - Mild < 50%     Chest discomfort 2013    MPI with no evidence of ischemia or infarct, normal LVEF    Dental disorder     Dentures/broken teeth    Dyslipidemia, goal LDL below 70      Gastroesophageal reflux disease     Hearing problem     History of chronic kidney disease 2022    Hypertension     Nonrheumatic aortic valve insufficiency 2023    Prostate cancer ([facility]) 2022     Past Surgical History:   Procedure Laterality Date    ANGIOGRAM  2009    Performed by DANIEL HASKINS at SURGERY [facility]    FEMORAL ANEURYSM  2009    Performed by DANIEL HASKINS at SURGERY [facility]     Family History   Problem Relation Age of Onset    Heart Attack Mother 70    Cancer Father         THROAT     Social History     Socioeconomic History    Marital status:      Spouse name: Not on file    Number of children: Not on file    Years of education: Not on file    Highest education level: Not on file   Occupational History    Not on file   Tobacco Use    Smoking status: Former     Current packs/day: 0.00     Average packs/day: 0.5 packs/day for 2.0 years (1.0 ttl pk-yrs)     Types: Cigarettes     Start date: 10/16/1971     Quit date: 10/16/1973     Years since quittin.5    Smokeless tobacco: Never    Tobacco comments:     Quit    Vaping Use    Vaping status: Never Used   Substance and Sexual Activity    Alcohol use: Not Currently     Alcohol/week: 0.6 oz     Types: 1 Glasses of wine per week     Comment: occ    Drug use: No    Sexual activity: Not on file     Comment: , has 4 children, retired.   Other Topics Concern    Not on file   Social History Narrative    Not on file     Social Drivers  of Health     Financial Resource Strain: Low Risk  (3/14/2024)    Overall Financial Resource Strain (CARDIA)     Difficulty of Paying Living Expenses: Not hard at all   Food Insecurity: No Food Insecurity (3/14/2024)    Hunger Vital Sign     Worried About Running Out of Food in the Last Year: Never true     Ran Out of Food in the Last Year: Never true   Transportation Needs: No Transportation Needs (3/14/2024)    PRAPARE - Transportation     Lack of Transportation (Medical): No     Lack of Transportation (Non-Medical): No   Physical Activity: Insufficiently Active (3/14/2024)    Exercise Vital Sign     Days of Exercise per Week: 1 day     Minutes of Exercise per Session: 60 min   Stress: No Stress Concern Present (3/14/2024)    [place] [employer] - Occupational Stress Questionnaire     Feeling of Stress : Not at all   Social Connections: Moderately Isolated (3/14/2024)    Social Connection and Isolation Panel [NHANES]     Frequency of Communication with Friends and Family: More than three times a week     Frequency of Social Gatherings with Friends and Family: More than three times a week     Attends Zoroastrian Services: Never     Active Member of Clubs or Organizations: No     Attends Club or Organization Meetings: Never     Marital Status:    Intimate Partner Violence: Not on file   Housing Stability: Low Risk  (3/14/2024)    Housing Stability Vital Sign     Unable to Pay for Housing in the Last Year: No     Number of Places Lived in the Last Year: 1     Unstable Housing in the Last Year: No     Allergies   Allergen Reactions    Nkda [No Known Drug Allergy]      Outpatient Encounter Medications as of 5/8/2025   Medication Sig Dispense Refill    carvedilol (COREG) 6.25 MG Tab TAKE 1 TABLET BY MOUTH TWICE A DAY WITH FOOD 200 Tablet 0    amLODIPine (NORVASC) 5 MG Tab Take 1 Tablet by mouth every day. 90 Tablet 3    hydroCHLOROthiazide 12.5 MG tablet Take 1 Tablet by mouth every day. 100  "Tablet 3    olmesartan (BENICAR) 40 MG Tab Take 1 Tablet by mouth every day. to lower blood pressure 100 Tablet 3    ezetimibe (ZETIA) 10 MG Tab Take 1 Tablet by mouth every day. To lower cholesterol and heart disease risk 100 Tablet 3    rosuvastatin (CRESTOR) 20 MG Tab Take 1 Tablet by mouth every day. To lower cholesterol and heart disease risk 100 Tablet 3    ASPIRIN LOW DOSE 81 MG EC tablet TAKE 1 TABLET BY MOUTH EVERY  Tablet 3    pantoprazole (PROTONIX) 40 MG Tablet Delayed Response 20 mg every day.      Magnesium Glycinate 100 MG Cap       vitamin D3 (CHOLECALCIFEROL) 1000 Unit (25 mcg) Tab Take 1,000 Units by mouth every day.      finasteride (PROSCAR) 5 MG Tab Take 5 mg by mouth every day.      Multiple Vitamin (MULTIVITAMIN PO) Take 1 Tab by mouth every day.       No facility-administered encounter medications on file as of 5/8/2025.     Review of Systems   Constitutional: Negative.    HENT: Negative.     Eyes: Negative.    Respiratory:  Negative for shortness of breath.    Cardiovascular:  Negative for chest pain, palpitations, orthopnea, leg swelling and PND.   Gastrointestinal:  Positive for heartburn.   Genitourinary: Negative.    Musculoskeletal: Negative.    Skin:  Positive for itching.   Neurological: Negative.    Endo/Heme/Allergies: Negative.    Psychiatric/Behavioral:  Negative for depression. The patient is not nervous/anxious.               Objective     /60 (BP Location: Left arm, Patient Position: Sitting, BP Cuff Size: Adult)   Pulse 60   Resp 16   Ht 1.626 m (5' 4\")   Wt 74.9 kg (165 lb 3.2 oz)   SpO2 95%   BMI 28.36 kg/m²     Physical Exam  Constitutional:       Appearance: Normal appearance.   HENT:      Head: Normocephalic.   Neck:      Vascular: No JVD.   Cardiovascular:      Rate and Rhythm: Normal rate and regular rhythm.      Pulses: Normal pulses.      Heart sounds: Murmur heard.      Systolic murmur is present with a grade of 3/6.      No friction rub. "   Pulmonary:      Effort: Pulmonary effort is normal.      Breath sounds: Normal breath sounds.   Abdominal:      Palpations: Abdomen is soft.   Musculoskeletal:         General: Normal range of motion.      Right lower leg: No edema.      Left lower leg: No edema.   Skin:     General: Skin is warm and dry.   Neurological:      General: No focal deficit present.      Mental Status: He is alert and oriented to person, place, and time.   Psychiatric:         Mood and Affect: Mood normal.         Behavior: Behavior normal.            Lab Results   Component Value Date/Time    WBC 4.7 (L) 04/09/2025 06:14 AM    RBC 4.39 (L) 04/09/2025 06:14 AM    HEMOGLOBIN 13.9 (L) 04/09/2025 06:14 AM    HEMATOCRIT 41.3 (L) 04/09/2025 06:14 AM    MCV 94.1 04/09/2025 06:14 AM    MCH 31.7 04/09/2025 06:14 AM    MCHC 33.7 04/09/2025 06:14 AM    MPV 9.7 04/09/2025 06:14 AM    NEUTSPOLYS 36.50 (L) 04/09/2025 06:14 AM    LYMPHOCYTES 47.00 (H) 04/09/2025 06:14 AM    MONOCYTES 10.80 04/09/2025 06:14 AM    EOSINOPHILS 5.10 04/09/2025 06:14 AM    BASOPHILS 0.40 04/09/2025 06:14 AM      Lab Results   Component Value Date/Time    CHOLSTRLTOT 127 12/16/2024 06:36 AM    LDL 68 12/16/2024 06:36 AM    HDL 36 (A) 12/16/2024 06:36 AM    TRIGLYCERIDE 115 12/16/2024 06:36 AM       Lab Results   Component Value Date/Time    SODIUM 135 12/16/2024 06:36 AM    POTASSIUM 3.9 12/16/2024 06:36 AM    CHLORIDE 100 12/16/2024 06:36 AM    CO2 25 12/16/2024 06:36 AM    GLUCOSE 106 (H) 12/16/2024 06:36 AM    BUN 17 12/16/2024 06:36 AM    CREATININE 1.24 12/16/2024 06:36 AM     Lab Results   Component Value Date/Time    ALKPHOSPHAT 58 12/16/2024 06:36 AM    ASTSGOT 25 12/16/2024 06:36 AM    ALTSGPT 24 12/16/2024 06:36 AM    TBILIRUBIN 0.4 12/16/2024 06:36 AM      CT chest 9/3/2024  1.  Stable 4.7 cm ascending thoracic aortic aneurysm.     2. Coronary artery calcification.     3. 4.8 cm left renal cyst. Bosniak 1     4. Stable 13 mm left pancreatic cyst.     5.  Cholelithiasis     Assessment & Plan     1. Ascending aorta dilatation (HCC)        2. Dyslipidemia, goal LDL below 70        3. Primary hypertension        4. Nonrheumatic aortic valve insufficiency            Medical Decision Making: Today's Assessment/Status/Plan:        Aneurysm of ascending aorta with aortic insufficiency  - Most recent CT from September showed 4.7 cm aneurysm  -CTS saw him and recommended surgery, however he elected medical management  - Being followed by vascular medicine   - Biannual CT scan, ordered, recommended that he get this done prior to his follow-up with vascular medicine in July  -We discussed avoiding lifting heavy weights and avoiding intense physical activity  -Echocardiogram scheduled for August     Hypertension  - Elevated at home, increase amlodipine to 10 mg daily  -Continue carvedilol 6.25 mg twice daily, hydrochlorothiazide 12.5 mg daily, olmesartan 40 mg daily  - goal < 130/80  - Continue to keep a log of home blood pressure readings     Hyperlipidemia with aortoiliac occlusive disease and PAD  - LDL 68  -continue rosuvastatin 20 mg daily with ezetimibe 10 mg daily  -LDL goal less than 70  -check annually           Follow-up in August with echocardiogram     This note was dictated using Dragon speech recognition software.

## 2025-05-09 ENCOUNTER — HOSPITAL ENCOUNTER (OUTPATIENT)
Dept: LAB | Facility: MEDICAL CENTER | Age: 80
End: 2025-05-09
Payer: MEDICARE

## 2025-05-09 ENCOUNTER — HOSPITAL ENCOUNTER (OUTPATIENT)
Dept: LAB | Facility: MEDICAL CENTER | Age: 80
End: 2025-05-09
Attending: NURSE PRACTITIONER
Payer: MEDICARE

## 2025-05-09 DIAGNOSIS — I10 PRIMARY HYPERTENSION: ICD-10-CM

## 2025-05-09 DIAGNOSIS — E78.5 DYSLIPIDEMIA, GOAL LDL BELOW 70: ICD-10-CM

## 2025-05-09 DIAGNOSIS — R73.03 PRE-DIABETES: ICD-10-CM

## 2025-05-09 LAB
ALBUMIN SERPL BCP-MCNC: 4.3 G/DL (ref 3.2–4.9)
ALBUMIN/GLOB SERPL: 1.4 G/DL
ALP SERPL-CCNC: 62 U/L (ref 30–99)
ALT SERPL-CCNC: 34 U/L (ref 2–50)
ANION GAP SERPL CALC-SCNC: 11 MMOL/L (ref 7–16)
AST SERPL-CCNC: 29 U/L (ref 12–45)
BILIRUB SERPL-MCNC: 0.9 MG/DL (ref 0.1–1.5)
BUN SERPL-MCNC: 15 MG/DL (ref 8–22)
CALCIUM ALBUM COR SERPL-MCNC: 9.5 MG/DL (ref 8.5–10.5)
CALCIUM SERPL-MCNC: 9.7 MG/DL (ref 8.5–10.5)
CHLORIDE SERPL-SCNC: 101 MMOL/L (ref 96–112)
CHOLEST SERPL-MCNC: 222 MG/DL (ref 100–199)
CO2 SERPL-SCNC: 27 MMOL/L (ref 20–33)
CREAT SERPL-MCNC: 1.22 MG/DL (ref 0.5–1.4)
CREAT UR-MCNC: 121 MG/DL
EST. AVERAGE GLUCOSE BLD GHB EST-MCNC: 137 MG/DL
GFR SERPLBLD CREATININE-BSD FMLA CKD-EPI: 60 ML/MIN/1.73 M 2
GLOBULIN SER CALC-MCNC: 3.1 G/DL (ref 1.9–3.5)
GLUCOSE SERPL-MCNC: 104 MG/DL (ref 65–99)
HBA1C MFR BLD: 6.4 % (ref 4–5.6)
HDLC SERPL-MCNC: 56 MG/DL
LDLC SERPL CALC-MCNC: 144 MG/DL
MICROALBUMIN UR-MCNC: 12.5 MG/DL
MICROALBUMIN/CREAT UR: 103 MG/G (ref 0–30)
POTASSIUM SERPL-SCNC: 4.1 MMOL/L (ref 3.6–5.5)
PROT SERPL-MCNC: 7.4 G/DL (ref 6–8.2)
SODIUM SERPL-SCNC: 139 MMOL/L (ref 135–145)
TRIGL SERPL-MCNC: 112 MG/DL (ref 0–149)

## 2025-05-09 PROCEDURE — 82043 UR ALBUMIN QUANTITATIVE: CPT

## 2025-05-09 PROCEDURE — 80061 LIPID PANEL: CPT

## 2025-05-09 PROCEDURE — 82570 ASSAY OF URINE CREATININE: CPT

## 2025-05-09 PROCEDURE — 80053 COMPREHEN METABOLIC PANEL: CPT

## 2025-05-09 PROCEDURE — 83036 HEMOGLOBIN GLYCOSYLATED A1C: CPT

## 2025-05-09 PROCEDURE — 36415 COLL VENOUS BLD VENIPUNCTURE: CPT

## 2025-05-13 ENCOUNTER — RESULTS FOLLOW-UP (OUTPATIENT)
Dept: CARDIOLOGY | Facility: MEDICAL CENTER | Age: 80
End: 2025-05-13

## 2025-05-19 NOTE — Clinical Note
[facility]  [address] [place] [address]    [id number]    Jasiel Silver  [address]  [place] [address]    May 19, 2025    Member Name: Jasiel Silver   Member Number: [id number]   Reference Number: [id number]   Approved Services: MRI/CAT Scan   Approved Service Dates: 05/08/2025 - 09/16/2025   Requesting Provider: Angella Chakraborty   Requested Provider: [facility]     Dear Jasiel Silver:    The following medical service(s) requested by Angella Chakraborty have been approved:    Procedure Code Procedure Code Name Requested Quantity Approved Quantity Status   84336 (CPT®) MO CT ANGIO, CHEST (NON-CORON), COMBO, INCL * 1 1 Authorized       Approved Quantity means the number of visits approved for medication treatments and/or medical services.    The services should be provided by [facility] no later than 09/16/2025. Please contact the provider listed below with any questions.     Provider Information:  [facility]  [phone]    Your plan benefit may require a deductible, co-payment or coinsurance for these services. This authorization does not guarantee [facility] will pay the claim for services that you receive. Payment by [facility] for these services is subject to the terms of your Evidence of Coverage, your eligibility at the time of service, and confirmation of benefit coverage.    For any questions or additional information, please contact Customer Service:    [employer] Plus Toll Free: [phone]  TTY users dial: [phone]   Call Center Hours:  Oct 1 - Mar 31, Mon - Fri 7 AM to 8 PM PST  Oct 1 - Mar 31, Sat - Sun 8 AM to 8 PM PST  Apr 1 - Sep 30, Mon - Fri 7 AM to 8 PM PST   Office Hours: Mon - Fri 8 AM to 5 PM PST   E-mail: [email or web address]   Website:  [email or web address]      This information is available for free in other languages. Please contact  Customer Service at the phone number above for more information. [facility] complies with applicable Federal civil rights laws and does not discriminate on the basis of race, color, national origin, age, disability or sex.    Sincerely,     Healthcare Utilization Management Department     Cc: [facility]   Angella Chakraborty    Multi-Language Insert  Multi- Services  English: We have free  services to answer any questions you may have about our health or drug plan.  To get an , just call us at [phone].  Someone who speaks English/Language can help you.  This is a free service.  Danish: Tenemos servicios de intérprete sin costo alguno  para responder cualquier pregunta que pueda tener sobre nuestro plan de leyla o medicamentos. Para hablar con un intérprete, por favor llame al [phone]. Alguien que hable español le podrá ayudar. Terrie es un servicio gratuito.  Chinese Mandarin: ?????????????????????????????? ???????????????? [phone]????????????????? ?????????  Chinese Cantonese: ?????????????????????????????? ????????????? [phone]???????????????????? ????????  Tagalog:  Mayroon kaming libreng serbisyo sa ingramsasalbinh yusufngward rudolph o panggamot.  isa Andrea  [phone]. Maaari gunner Garcia.  Jean fall.  Croatian:  Nous proposons jhoana services gratuits d'interprétation pour répondre à toutes angelique questions relatives à notre régime de santé ou d'assurance-médicaments. Pour accéder au service d'interprétation, il vous suffit de nous appeler au [phone]. Un interlocuteur parOrthopaedic Hospital of Wisconsin - Glendalesarah Français pourra vous aider. Ce service est gratuit.  Malian:  Monique marin có d?ch v? thông d?ch mi?n phí ð? tr? l?i các câu h?i v? chýõng s?c kh?e và chýõng trìn thu?c men.  N?u quí v? c?n thông d?ch viên france g?i [phone] s? có nhân viên nói ti?ng Vi?t giúp ð? quí v?. Ðây là d?ch v? mi?n phí .  Indonesian:  Unser kostenser Dolmetscherservice beantwortet Ihren Fragen zu unserem Gesundheits- und Arzneimittelplan. Unsere Dolmetscher erreichen Sie [phone]. Man wird Ihnen alexandr auf [facility]. Dieser Service ist husseinShriners Hospitals for Children.  Lithuanian:  ??? ?? ?? ?? ?? ??? ?? ??? ?? ???? ?? ?? ???? ???? ????. ?? ???? ????? ?? [phone] ??? ??? ????.  ???? ?? ???? ?? ?? ????. ? ???? ??? ?????.   Sao Tomean: Åñëè ó âàñ âîçíèêíóò âîïðîñû îòíîñèòåëüíî ñòðàõîâîãî èëè ìåäèêàìåíòíîãî ïëàíà, âû ìîæåòå âîñïîëüçîâàòüñÿ íàøèìè áåñïëàòíûìè óñëóãàìè ïåðåâîä÷èêîâ. ×òîáû âîñïîëüçîâàòüñÿ óñëóãàìè ïåðåâîä÷èêà, ïîçâîíèòå íàì ïî òåëåôîíó [phone]. Âàì îêàæåò ïîìîùü ñîòðóäíèê, êîòîðûé ãîâîðèò ïî-póññêè. Äàííàÿ óñëóãà áåñïëàòíàÿ.  Ukrainian: ÅääÇ äÞÏã ÎÏãÇÊ ÇáãÊÑÌã ÇáÝæÑí ÇáãÌÇäíÉ ááÅÌÇÈÉ Úä Ãí ÃÓÆáÉ ÊÊÚáÞ ÈÇáÕÍÉ Ãæ ÌÏæá ÇáÃÏæíÉ áÏíäÇ. ááÍÕæá Úáì ãÊÑÌã ÝæÑí¡ áíÓ Úáíß Óæì ÇáÇÊÕÇá ÈäÇ Úáì [phone] . ÓíÞæã ÔÎÕ ãÇ íÊÍÏË ÇáÚÑÈíÉ ÈãÓÇÚÏÊß. åÐå ÎÏãÉ ãÌÇäíÉ.  Marlee: ????? ????????? ?? ??? ?? ????? ?? ???? ??? ???? ???? ?? ?????? ?? ???? ???? ?? ??? ????? ??? ????? ???????? ?????? ?????? ???. ?? ???????? ??????? ???? ?? ???, ?? ???? [phone] ?? ??? ????. ??? ??????? ?? ?????? ????? ?? ???? ??? ?? ???? ??. ?? ?? ????? ???? ??.   Swazi:  È disponibile un servizio di interpretariato gratuito per rispondere a eventuali domande sul nostro piano sanitario e farmaceutico. Per un interprete, contattare il jane [phone]. Un nostro incaricato cely parla Italianovi fornirà l'assistenza necessaria. È un servizio gratuito.  Portugués:  Dispomos de serviços de interpretação gratuitos para responder a qualquer questão que tenha acerca do nosso plano de saúde ou de medicação. Para obter um intérprete, contacte-nos através do número [phone]. Irá encontrar alguém que fale o idioma  Português para o ajudar.  Terrie serviço é gratuito.  [place]ican Creole:  Nou genyen sèvis entèprèt gratis jas reponn tout kesyon ou ta genyen konsènan plan medikal oswa dwòg nou an.  Jas jwenn yon entèprèt, jis rele nou nan [phone]. Yon moun ki pale Kreyòl kapab chandler w.  Sa a se yon sèvis ki gratis.  Polish:  Umo¿liwiamy bezp³atne skorzystanie z us³ug t³umacza ustnego, który pomo¿e w uzyskaniu odpowiedzi na temat planu zdrowotnego lub dawepinia devanw. Paige skorzystaæ z pomocy t³umacza znaj¹cego angel chung¿y zadzwoniæ pod numer [phone]. Ta us³uga jest bezp³atna.  Micronesian: ????? ??????? ????????????????????? ??????????????????????????????????[phone] ???????????????? ? ????????????????? ?????

## 2025-05-29 ENCOUNTER — HOSPITAL ENCOUNTER (OUTPATIENT)
Dept: RADIOLOGY | Facility: MEDICAL CENTER | Age: 80
End: 2025-05-29
Payer: MEDICARE

## 2025-05-29 DIAGNOSIS — I77.810 ASCENDING AORTA DILATATION (HCC): ICD-10-CM

## 2025-05-29 PROCEDURE — 71275 CT ANGIOGRAPHY CHEST: CPT

## 2025-05-29 PROCEDURE — 700117 HCHG RX CONTRAST REV CODE 255

## 2025-05-29 RX ADMIN — IOHEXOL 100 ML: 350 INJECTION, SOLUTION INTRAVENOUS at 15:29

## 2025-05-30 ENCOUNTER — RESULTS FOLLOW-UP (OUTPATIENT)
Dept: CARDIOLOGY | Facility: MEDICAL CENTER | Age: 80
End: 2025-05-30
Payer: MEDICARE

## 2025-06-02 ENCOUNTER — DOCUMENTATION (OUTPATIENT)
Dept: VASCULAR LAB | Facility: MEDICAL CENTER | Age: 80
End: 2025-06-02
Payer: MEDICARE

## 2025-06-02 DIAGNOSIS — I72.8 HEPATIC ARTERY ANEURYSM (HCC): Primary | ICD-10-CM

## 2025-06-02 NOTE — PROGRESS NOTES
Hepatic artery aneurysm noted on patient's most recent CT measured at 2.3cm.  Will add to his diagnosis list and update surveillance for a CTA chest -- will order at July appt.      Beti Ricardo [facility]

## 2025-06-18 DIAGNOSIS — I10 PRIMARY HYPERTENSION: ICD-10-CM

## 2025-06-18 DIAGNOSIS — I71.21 ANEURYSM OF ASCENDING AORTA WITHOUT RUPTURE (HCC): Chronic | ICD-10-CM

## 2025-06-18 RX ORDER — OLMESARTAN MEDOXOMIL 20 MG/1
20 TABLET ORAL DAILY
Qty: 100 TABLET | Refills: 3 | Status: SHIPPED | OUTPATIENT
Start: 2025-06-18

## 2025-07-09 ENCOUNTER — TELEPHONE (OUTPATIENT)
Dept: HEALTH INFORMATION MANAGEMENT | Facility: OTHER | Age: 80
End: 2025-07-09
Payer: MEDICARE

## 2025-07-12 DIAGNOSIS — I10 PRIMARY HYPERTENSION: ICD-10-CM

## 2025-07-12 DIAGNOSIS — I77.810 ASCENDING AORTA DILATATION (HCC): ICD-10-CM

## 2025-07-14 RX ORDER — CARVEDILOL 6.25 MG/1
6.25 TABLET ORAL 2 TIMES DAILY WITH MEALS
Qty: 200 TABLET | Refills: 0 | Status: SHIPPED | OUTPATIENT
Start: 2025-07-14

## 2025-07-14 NOTE — TELEPHONE ENCOUNTER
Is the patient due for a refill? Yes    Was the patient seen the last 15 months? Yes    Date of last office visit: 5/8/2025    Does the patient have an upcoming appointment?  Yes   If yes, When? 9/12/2025    Provider to refill:LH    Does the patient have [facility] and need 100-day supply? (This applies to ALL medications) Yes, quantity updated to 100 days

## 2025-07-21 ENCOUNTER — HOSPITAL ENCOUNTER (OUTPATIENT)
Dept: LAB | Facility: MEDICAL CENTER | Age: 80
End: 2025-07-21
Attending: NURSE PRACTITIONER
Payer: MEDICARE

## 2025-07-21 ENCOUNTER — HOSPITAL ENCOUNTER (OUTPATIENT)
Dept: LAB | Facility: MEDICAL CENTER | Age: 80
End: 2025-07-21
Attending: INTERNAL MEDICINE
Payer: MEDICARE

## 2025-07-21 DIAGNOSIS — R73.03 PRE-DIABETES: ICD-10-CM

## 2025-07-21 DIAGNOSIS — E78.5 DYSLIPIDEMIA, GOAL LDL BELOW 70: ICD-10-CM

## 2025-07-21 DIAGNOSIS — I10 PRIMARY HYPERTENSION: ICD-10-CM

## 2025-07-21 LAB
ALBUMIN SERPL BCP-MCNC: 4.4 G/DL (ref 3.2–4.9)
ALBUMIN/GLOB SERPL: 1.6 G/DL
ALP SERPL-CCNC: 50 U/L (ref 30–99)
ALT SERPL-CCNC: 25 U/L (ref 2–50)
ANION GAP SERPL CALC-SCNC: 11 MMOL/L (ref 7–16)
AST SERPL-CCNC: 36 U/L (ref 12–45)
BASOPHILS # BLD AUTO: 0.4 % (ref 0–1.8)
BASOPHILS # BLD: 0.02 K/UL (ref 0–0.12)
BILIRUB SERPL-MCNC: 0.8 MG/DL (ref 0.1–1.5)
BUN SERPL-MCNC: 18 MG/DL (ref 8–22)
CALCIUM ALBUM COR SERPL-MCNC: 8.9 MG/DL (ref 8.5–10.5)
CALCIUM SERPL-MCNC: 9.2 MG/DL (ref 8.5–10.5)
CHLORIDE SERPL-SCNC: 106 MMOL/L (ref 96–112)
CHOLEST SERPL-MCNC: 97 MG/DL (ref 100–199)
CO2 SERPL-SCNC: 23 MMOL/L (ref 20–33)
CREAT SERPL-MCNC: 1.34 MG/DL (ref 0.5–1.4)
EOSINOPHIL # BLD AUTO: 0.51 K/UL (ref 0–0.51)
EOSINOPHIL NFR BLD: 10.6 % (ref 0–6.9)
ERYTHROCYTE [DISTWIDTH] IN BLOOD BY AUTOMATED COUNT: 43.9 FL (ref 35.9–50)
EST. AVERAGE GLUCOSE BLD GHB EST-MCNC: 131 MG/DL
FASTING STATUS PATIENT QL REPORTED: NORMAL
FERRITIN SERPL-MCNC: 361 NG/ML (ref 22–322)
GFR SERPLBLD CREATININE-BSD FMLA CKD-EPI: 54 ML/MIN/1.73 M 2
GLOBULIN SER CALC-MCNC: 2.7 G/DL (ref 1.9–3.5)
GLUCOSE SERPL-MCNC: 105 MG/DL (ref 65–99)
HBA1C MFR BLD: 6.2 % (ref 4–5.6)
HCT VFR BLD AUTO: 38.4 % (ref 42–52)
HDLC SERPL-MCNC: 47 MG/DL
HGB BLD-MCNC: 12.7 G/DL (ref 14–18)
IMM GRANULOCYTES # BLD AUTO: 0.01 K/UL (ref 0–0.11)
IMM GRANULOCYTES NFR BLD AUTO: 0.2 % (ref 0–0.9)
IRON SATN MFR SERPL: 30 % (ref 15–55)
IRON SERPL-MCNC: 93 UG/DL (ref 50–180)
LDLC SERPL CALC-MCNC: 38 MG/DL
LYMPHOCYTES # BLD AUTO: 1.8 K/UL (ref 1–4.8)
LYMPHOCYTES NFR BLD: 37.3 % (ref 22–41)
MCH RBC QN AUTO: 31.2 PG (ref 27–33)
MCHC RBC AUTO-ENTMCNC: 33.1 G/DL (ref 32.3–36.5)
MCV RBC AUTO: 94.3 FL (ref 81.4–97.8)
MONOCYTES # BLD AUTO: 0.52 K/UL (ref 0–0.85)
MONOCYTES NFR BLD AUTO: 10.8 % (ref 0–13.4)
NEUTROPHILS # BLD AUTO: 1.97 K/UL (ref 1.82–7.42)
NEUTROPHILS NFR BLD: 40.7 % (ref 44–72)
NRBC # BLD AUTO: 0 K/UL
NRBC BLD-RTO: 0 /100 WBC (ref 0–0.2)
PLATELET # BLD AUTO: 188 K/UL (ref 164–446)
PMV BLD AUTO: 10.6 FL (ref 9–12.9)
POTASSIUM SERPL-SCNC: 4.2 MMOL/L (ref 3.6–5.5)
PROT SERPL-MCNC: 7.1 G/DL (ref 6–8.2)
PSA SERPL DL<=0.01 NG/ML-MCNC: 0.31 NG/ML (ref 0–4)
RBC # BLD AUTO: 4.07 M/UL (ref 4.7–6.1)
SODIUM SERPL-SCNC: 140 MMOL/L (ref 135–145)
TIBC SERPL-MCNC: 311 UG/DL (ref 250–450)
TRIGL SERPL-MCNC: 62 MG/DL (ref 0–149)
UIBC SERPL-MCNC: 218 UG/DL (ref 110–370)
WBC # BLD AUTO: 4.8 K/UL (ref 4.8–10.8)

## 2025-07-21 PROCEDURE — 82728 ASSAY OF FERRITIN: CPT

## 2025-07-21 PROCEDURE — 80061 LIPID PANEL: CPT

## 2025-07-21 PROCEDURE — 85025 COMPLETE CBC W/AUTO DIFF WBC: CPT

## 2025-07-21 PROCEDURE — 83540 ASSAY OF IRON: CPT

## 2025-07-21 PROCEDURE — 84153 ASSAY OF PSA TOTAL: CPT

## 2025-07-21 PROCEDURE — 83550 IRON BINDING TEST: CPT

## 2025-07-21 PROCEDURE — 36415 COLL VENOUS BLD VENIPUNCTURE: CPT

## 2025-07-21 PROCEDURE — 80053 COMPREHEN METABOLIC PANEL: CPT

## 2025-07-21 PROCEDURE — 83036 HEMOGLOBIN GLYCOSYLATED A1C: CPT

## 2025-07-22 DIAGNOSIS — I10 PRIMARY HYPERTENSION: Primary | ICD-10-CM

## 2025-07-22 NOTE — PROGRESS NOTES
Established patient  Chart prep for upcoming appointment.    Any pending/incomplete orders from last visit? No, all orders completed.  Was patient called and reminded to complete pending orders? N/A orders complete  Were any records requested?  No    Referral up to date? Yes renewal ordered, sent to provider to co-sign, AND new referral attached to upcoming appointment.    Referral attached to appointment (renewals and New patients only)? Yes  Virtual appointment? No            Kenney Peralta, Medical Assistant   [facility]   Ph: [phone]  Fx: [phone]

## 2025-07-28 ENCOUNTER — OFFICE VISIT (OUTPATIENT)
Dept: VASCULAR LAB | Facility: MEDICAL CENTER | Age: 80
End: 2025-07-28
Attending: NURSE PRACTITIONER
Payer: MEDICARE

## 2025-07-28 VITALS
HEART RATE: 57 BPM | HEIGHT: 64 IN | WEIGHT: 169 LBS | DIASTOLIC BLOOD PRESSURE: 62 MMHG | BODY MASS INDEX: 28.85 KG/M2 | SYSTOLIC BLOOD PRESSURE: 110 MMHG

## 2025-07-28 DIAGNOSIS — R26.89 BALANCE PROBLEM: ICD-10-CM

## 2025-07-28 DIAGNOSIS — I74.09 AORTOILIAC OCCLUSIVE DISEASE (HCC): Primary | Chronic | ICD-10-CM

## 2025-07-28 DIAGNOSIS — I72.8 HEPATIC ARTERY ANEURYSM (HCC): ICD-10-CM

## 2025-07-28 DIAGNOSIS — I72.3 ANEURYSM OF LEFT ILIAC ARTERY (HCC): Chronic | ICD-10-CM

## 2025-07-28 DIAGNOSIS — I71.21 ANEURYSM OF ASCENDING AORTA WITHOUT RUPTURE (HCC): Chronic | ICD-10-CM

## 2025-07-28 DIAGNOSIS — I77.810 ASCENDING AORTA DILATATION (HCC): ICD-10-CM

## 2025-07-28 DIAGNOSIS — I10 PRIMARY HYPERTENSION: ICD-10-CM

## 2025-07-28 DIAGNOSIS — Z95.828 S/P INSERTION OF ILIAC ARTERY STENT: Chronic | ICD-10-CM

## 2025-07-28 DIAGNOSIS — E78.5 HYPERLIPIDEMIA, UNSPECIFIED HYPERLIPIDEMIA TYPE: ICD-10-CM

## 2025-07-28 PROCEDURE — 3074F SYST BP LT 130 MM HG: CPT | Performed by: NURSE PRACTITIONER

## 2025-07-28 PROCEDURE — 99214 OFFICE O/P EST MOD 30 MIN: CPT | Performed by: NURSE PRACTITIONER

## 2025-07-28 PROCEDURE — 3078F DIAST BP <80 MM HG: CPT | Performed by: NURSE PRACTITIONER

## 2025-07-28 PROCEDURE — 99212 OFFICE O/P EST SF 10 MIN: CPT

## 2025-07-28 RX ORDER — OLMESARTAN MEDOXOMIL 20 MG/1
20 TABLET ORAL
Qty: 200 TABLET | Refills: 3 | Status: SHIPPED | OUTPATIENT
Start: 2025-07-28

## 2025-07-28 ASSESSMENT — ENCOUNTER SYMPTOMS
WHEEZING: 0
PALPITATIONS: 0
CLAUDICATION: 0
ORTHOPNEA: 0
CHILLS: 0
HEMOPTYSIS: 0
COUGH: 0
PND: 0
SPUTUM PRODUCTION: 0
SHORTNESS OF BREATH: 0
FEVER: 0

## 2025-07-28 ASSESSMENT — FIBROSIS 4 INDEX: FIB4 SCORE: 3.03

## 2025-07-28 NOTE — PROGRESS NOTES
VASCULAR MEDICINE CLINIC - Follow up VISIT  2025    Jasiel Silver is a 79 y.o. 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 Viramontes)  L internal iliac art coiling for aneurysm:  Feeling well.  No claudication.  Last RUSTY 2018.  No other new sx    HTN: Stable, tolerating meds with good adherence, Home BP los-130's/70s    HLD: Stable, current treatment: High intensity statin - tolerating, good adherence; did not start zetia for unclear reasons   Antithrombotic tx: Yes, Details: ASA 81mg, no bleeding noted       ALLERGIES  Nkda [no known drug allergy]    Social History     Tobacco Use    Smoking status: Former     Current packs/day: 0.00     Average packs/day: 0.5 packs/day for 2.0 years (1.0 ttl pk-yrs)     Types: Cigarettes     Start date: 10/16/1971     Quit date: 10/16/1973     Years since quittin.8    Smokeless tobacco: Never    Tobacco comments:     Quit    Vaping Use    Vaping status: Never Used   Substance Use Topics    Alcohol use: Not Currently     Alcohol/week: 0.6 oz     Types: 1 Glasses of wine per week     Comment: occ    Drug use: No     DIET AND EXERCISE:  Weight Change:stable  Diet: common adult  Exercise: moderate regular exercise program     Review of Systems   Constitutional:  Negative for chills, fever and malaise/fatigue.   Respiratory:  Negative for cough, hemoptysis, sputum production, shortness of breath and wheezing.    Cardiovascular:  Negative for chest pain, palpitations, orthopnea, claudication, leg swelling and PND.          Objective    Vitals:    25 1351 25 1354   BP: 132/65 110/62   BP Location: Left arm Left arm   Patient Position: Sitting Sitting   BP Cuff  "Size: Adult Adult   Pulse: (!) 58 (!) 57   Weight: 76.7 kg (169 lb)    Height: 1.626 m (5' 4\")      BP Readings from Last 4 Encounters:   07/28/25 110/62   05/08/25 116/60   01/29/25 130/70   10/21/24 (!) 149/72      Body mass index is 29.01 kg/m².   Wt Readings from Last 4 Encounters:   07/28/25 76.7 kg (169 lb)   05/08/25 74.9 kg (165 lb 3.2 oz)   01/29/25 73.9 kg (163 lb)   10/21/24 75.3 kg (166 lb)      Physical Exam  Constitutional:       General: He is not in acute distress.     Appearance: Normal appearance. He is not diaphoretic.   HENT:      Head: Normocephalic and atraumatic.   Eyes:      Conjunctiva/sclera: Conjunctivae normal.   Cardiovascular:      Rate and Rhythm: Normal rate and regular rhythm.      Pulses:           Carotid pulses are 2+ on the right side and 2+ on the left side.       Radial pulses are 2+ on the right side and 2+ on the left side.        Dorsalis pedis pulses are 2+ on the right side and 2+ on the left side.        Posterior tibial pulses are 2+ on the right side and 2+ on the left side.      Heart sounds: Normal heart sounds.   Pulmonary:      Effort: Pulmonary effort is normal.      Breath sounds: Normal breath sounds.   Musculoskeletal:      Right lower leg: No edema.      Left lower leg: No edema.   Skin:     General: Skin is warm and dry.   Neurological:      Mental Status: He is alert and oriented to person, place, and time.      Cranial Nerves: No cranial nerve deficit.      Gait: Gait is intact.   Psychiatric:         Mood and Affect: Mood and affect normal.        DATA REVIEW    Lab Results   Component Value Date/Time    CHOLSTRLTOT 97 (L) 07/21/2025 06:16 AM    LDL 38 07/21/2025 06:16 AM    HDL 47 07/21/2025 06:16 AM    TRIGLYCERIDE 62 07/21/2025 06:16 AM       Lab Results   Component Value Date/Time    SODIUM 140 07/21/2025 06:16 AM    POTASSIUM 4.2 07/21/2025 06:16 AM    CHLORIDE 106 07/21/2025 06:16 AM    CO2 23 07/21/2025 06:16 AM    GLUCOSE 105 (H) 07/21/2025 06:16 AM "    BUN 18 07/21/2025 06:16 AM    CREATININE 1.34 07/21/2025 06:16 AM     Lab Results   Component Value Date/Time    ALKPHOSPHAT 50 07/21/2025 06:16 AM    ASTSGOT 36 07/21/2025 06:16 AM    ALTSGPT 25 07/21/2025 06:16 AM    TBILIRUBIN 0.8 07/21/2025 06:16 AM       Lab Results   Component Value Date/Time    HBA1C 6.2 (H) 07/21/2025 06:16 AM       Lab Results   Component Value Date/Time    MALBCRT 103 (H) 05/09/2025 06:28 AM    MICROALBUR 12.5 05/09/2025 06:28 AM       Cardiovascular Imaging:    OP note 2009 (Dr. Viramontes)  POSTOPERATIVE DIAGNOSIS:  Symptomatic left common iliac artery aneurysm.  OPERATIONS PERFORMED  1.  Left common femoral artery exposure.  2.  Percutaneous cannulation of right common femoral artery under ultrasound guidance.  3.  Catheter, aorta.  4.  Selective catheterization of left hypogastric artery from contralateral approach.  5.  Proximal left hypogastric artery embolization using the 10-mm Amplatzer plug.  6.  Endovascular repair of symptomatic left [id number] common iliac artery aneurysm using 13 x 85 mm and 13 x 55 mm iliac limbs.    CTA thoracic Ao 2013  1. Aneurysmal dilatation of the ascending thoracic aorta to 4.4 cm.   2. Status post stenting of left common iliac and external iliac arteries.   3. Diverticulosis without evidence of diverticulitis.   4. Left renal cyst.     Ao/iliac duplex 2018 (no final report)   No evidence of abdominal aortic or iliac artery aneurysm. Common iliac    diameter:    Right-  1.3 cm            Left-   1.3 cm   Triphasic flow throughout the abdominal aorta and bilateral iliac arteries   with    no evidence of significant stenosis.   Patent stent noted in the left common iliac artery.   No stenosis of the proximal segment of the major visceral arteries.    RUSTY 2018  Normal bilateral lower extremity arterial physiologic study at rest.     Echo 2019  Compared to the images of the prior study done 12/28/2017, Aortic valve   stenosis slightly  progressed.  Aortic root and the ascending aorta dilatation.Measures 4.4 cm.  Left ventricular ejection fraction is visually estimated to be 60%.  Normal diastolic function.  Mild to moderate aortic stenosis. Mild aortic insufficiency.    Echo 8/2023  Compared to the prior study on 04/16/2021, ascending aorta size has increased.  The left ventricular ejection fraction is estimated to be 60%.  Tricuspid aortic valve.  Mild-moderate aortic insufficiency.  The ascending aorta is dilated with a diameter of 4.8 cm.     MPI 8/2023   NUCLEAR IMAGING INTERPRETATION   No evidence of significant jeopardized viable myocardium or prior myocardial    infarction.   Normal left ventricular size, ejection fraction, and wall motion.   ECG INTERPRETATION   Nondiagnostic due to resting ECG.    CTA chest 2/17/24   1. 5 cm ascending thoracic aortic aneurysm.  2. 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  3. Cholelithiasis.  4. Simple, Bosniak 1, left renal cysts which do not require follow-up.  5. Colonic diverticulosis.    Carotid 3/2024  Normal bilateral carotid exam.       CT chest 5/2025   1.  Dilated ascending aorta measuring 4.5 x 4.4 cm similar to previous.  2.  2.3 cm aneurysm of the hepatic artery previously measured 2.2 cm.  3.  1.3 cm pancreatic cyst similar to previous.  4.  Cholelithiasis.  5.  Bosniak 1 and Bosniak 2 classification left renal cyst similar to previous.    Medical Decision Making:  Today's Assessment / Status / Plan:     1. Aortoiliac occlusive disease (HCC)        2. S/P insertion of iliac artery stent        3. Aneurysm of left iliac artery (HCC)        4. Ascending aorta dilatation (HCC)        5. Primary hypertension  olmesartan (BENICAR) 20 MG Tab    CBC WITHOUT DIFFERENTIAL    MICROALBUMIN CREAT RATIO URINE    Comp Metabolic Panel      6. Aneurysm of ascending aorta without rupture (HCC)  olmesartan (BENICAR) 20 MG Tab      7. Balance problem   Referral to Physical Therapy      8. Hepatic artery aneurysm (HCC)  CT-CTA CHEST WITH & W/O-POST PROCESS      9. Hyperlipidemia, unspecified hyperlipidemia type  APOLIPOPROTEIN B    Lipid Profile        Etiology of Established CVD if Present:     1) Ascending aortic aneurysm - stable, asymptomatic.  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  - No reported fhx or pmhx of connective tissue disease  - Echo shows no biscupid Ao valve  - at initial visit - reviewed anatomy, risks, emergency s/s requiring immediate attention, and thresholds for surgical intervention and gave handout   - saw CT surgery in 3/2024- elected currently for medical management and close surveillance  Plan:   - continue med mgmt - focus BB and ARB as 1st line BP agents   - recommend 1st degree relatives get screened with echo for TAA as recommended by 2022 ACC guideline  - avoid all tobacco products and prescription or illicit stimulants   - activity restrictions include no long duration high intensity cardio, extreme endurance activities, high-intensity strength training >20-30lbs, and avoid straining or holding breath when lifting  - reviewed emergency s/s requiring immediate attention   - check echo sept 2025    2) Left common iliac aneurysm, s/p stenting 2009 - stable, no claudication  2009 - s/p L iliac   2018 duplex = stable repair   - No indications of true chronic  limb threatening ischemia (CLTI) changes or hx of lifestyle-limiting symptoms   Millie class:  I  Change in Pain-free walking distance: n/a  Change in Maximum walking distance: n/a  - as reviewed with patient, evidenced-based standard of care includes risk factor reduction, med mgmt, and exercise therapy with limited need for surgical intervention in majority of patients if there is CLTI or severe lifestyle limiting symptoms after >6mo of standard therapy   Plan:  - continue TLC measures and continued complete cessation of tobacco products   - continue structured exercise therapy for 12 weeks as outlined in care instructions, monitor pain-free and total walking distance to monitor progress  - continue antiplatelet therapy, RAAS blocker, statin   - plan for Ao/iliac duplex in Feb 2026    3) Hepatic artery aneurysm 2.3cm (previously 2.2), noted CT chest 5/2025  Denies abdominal pain.   Discussed case with medical director.  Does not appear to be growing rapidly.  Will obtain CTA chest in Sept to re-eval.  - CTA chest -- Sept 2025    LIPID MANAGEMENT  Qualifies for Statin Therapy Based on 2018 ACC/AHA Guidelines: yes, Secondary Prevention - Very high risk ASCVD - 2 major events or 1 event with other high-risk conditions  10-yr ASCVD risk score: The ASCVD Risk score (Татьяна DK, et al., 2019) failed to calculate., N/A  Major ASCVD events: Symptomatic PAD (hx of claudication with RUSTY <0.85, previous revascularization/amputation)    High-risk conditions: >64yr old  and Hypertension   Risk-enhancers: N/A  Currently on Statin: Yes  Tx goals: LDL-C <55   At goal?   Plan:   - continue rosuva 20mg dialy   - continue zetia 10mg daily  - recheck lipid in 6 months     BLOOD PRESSURE MANAGEMENT  BP Goal ACC/AHA (2017) goal <130/80  Home BP at goal:  yes  Office BP at goal:  yes  24h ABPM:  not ordered to date   RDN candidate? NO  Contributing factors: n/a   BP meds recommended for reduction of expansion rate of TAA:   -  "Beta-blocker (anti-impulse therapy to decrease pulsatile wall stress)   - ARB (inhibits TGF-beta activity in aortic wall,, most widely studied is losartan)   Plan:   Monitoring:   - start/continue home BP monitoring, reviewed correct technique, provide BP log and instructions  Medications:  - continue olmesartan 40mg daily   - continue HCTZ 12.5mg daily   - continue carvedilol 6.25mg BID     GLYCEMIC MANAGEMENT Prediabetic with MetS - 4/5 components   Lab Results   Component Value Date    HBA1C 6.2 (H) 07/21/2025   - higher components equates to higher system-wide inflammation and ASCVD and T2D risk   - estimated 2-fold increase ASCVD events for both primary/secondary prevention   - estimated 4-6-fold increase in development of full-blown T2D  Plan:  - focus on healthy macromolecules choices and overall reduced kcal diet, such as Med diet approach  - focus on body weight reduction of 5-7% as weight loss goal   - consider metformin initiation up to 850mg BID if A1c 6.2+ in future to reduce T2D progression     ANTITHROMBOTIC THERAPY   - continue ASA 81mg daily    LIFESTYLE INTERVENTIONS  TOBACCO: Stages of Change: Maintenance (sustained change >6mo)     reports that he quit smoking about 51 years ago. His smoking use included cigarettes. He started smoking about 53 years ago. He has a 1 pack-year smoking history. He has never used smokeless tobacco.   - continued complete avoidance of all tobacco products   PHYSICAL ACTIVITY: encourage daily activity targeting >150min/week of moderate-level activity or as much as tolerated  WT MGMT/NUTRITION: Mediterranean style  and reduce Na to 1500mg/day        OTHER:     # imbalance.  Does not appear to be related to hypotension.  Denies dizziness or lightheadedness, feels more like he is going to \"dive down to the ground\".  Recommended he discuss with PCP, but as his PCP recently retired, I will place a PT referral for him.  - Ref to PT     Studies to Be Obtained: aorto/iliac " duplex- Feb 2026  Echo and CTA chest - Sept 2025   Labs to Be Obtained: as re-ordered today     Follow up in: 6 months      ERIC Covington.   [facility]  [facility]  [phone]

## 2025-07-28 NOTE — Clinical Note
Jasmyn Polanco-- Bloch and I discussed this one-- he has a hepatic artery aneurysm that was noted on last CT.  Bloch said lets do a CTA in Sept when he has his echo.  His echo is already scheduled, I added the CTA today-- can you make sure he gets scheduled for that in Sept and add to your spreadsheet.  Thanks! Shayan

## 2025-07-29 ENCOUNTER — TELEPHONE (OUTPATIENT)
Dept: VASCULAR LAB | Facility: MEDICAL CENTER | Age: 80
End: 2025-07-29
Payer: MEDICARE

## 2025-07-29 NOTE — TELEPHONE ENCOUNTER
AC        Caller: Jasiel Carmonaobias      Topic/issue: Patient was returning a message he received from us and he asked for a call back. Please advise        Callback Number: [phone]      Thank you    -Jose DARNELL

## 2025-07-31 NOTE — TELEPHONE ENCOUNTER
Returned ph call and left message.  Provided imaging ph# to schedule CTA Chest as well.    Shireen Padilla, Med Ass't  [facility]  Ph. [phone]  Fx. [phone]